# Patient Record
Sex: MALE | Race: WHITE | Employment: FULL TIME | URBAN - METROPOLITAN AREA
[De-identification: names, ages, dates, MRNs, and addresses within clinical notes are randomized per-mention and may not be internally consistent; named-entity substitution may affect disease eponyms.]

---

## 2017-08-29 ENCOUNTER — OFFICE VISIT (OUTPATIENT)
Dept: ORTHOPEDIC SURGERY | Age: 42
End: 2017-08-29

## 2017-08-29 ENCOUNTER — OFFICE VISIT (OUTPATIENT)
Dept: FAMILY MEDICINE CLINIC | Age: 42
End: 2017-08-29

## 2017-08-29 VITALS — HEIGHT: 73 IN | BODY MASS INDEX: 34.46 KG/M2 | WEIGHT: 260 LBS

## 2017-08-29 VITALS
OXYGEN SATURATION: 97 % | HEART RATE: 88 BPM | BODY MASS INDEX: 34.72 KG/M2 | HEIGHT: 73 IN | SYSTOLIC BLOOD PRESSURE: 122 MMHG | WEIGHT: 262 LBS | DIASTOLIC BLOOD PRESSURE: 78 MMHG | TEMPERATURE: 98 F | RESPIRATION RATE: 18 BRPM

## 2017-08-29 DIAGNOSIS — E11.9 TYPE 2 DIABETES MELLITUS WITHOUT COMPLICATION, WITHOUT LONG-TERM CURRENT USE OF INSULIN (HCC): ICD-10-CM

## 2017-08-29 DIAGNOSIS — M25.512 ACUTE PAIN OF LEFT SHOULDER: Primary | ICD-10-CM

## 2017-08-29 DIAGNOSIS — S46.012A ROTATOR CUFF STRAIN, LEFT, INITIAL ENCOUNTER: ICD-10-CM

## 2017-08-29 DIAGNOSIS — M79.632 LEFT FOREARM PAIN: ICD-10-CM

## 2017-08-29 DIAGNOSIS — G89.29 CHRONIC LEFT SHOULDER PAIN: ICD-10-CM

## 2017-08-29 DIAGNOSIS — E78.5 HYPERLIPIDEMIA, UNSPECIFIED HYPERLIPIDEMIA TYPE: Primary | ICD-10-CM

## 2017-08-29 DIAGNOSIS — M25.512 CHRONIC LEFT SHOULDER PAIN: ICD-10-CM

## 2017-08-29 LAB
ALBUMIN SERPL-MCNC: 4.1 G/DL (ref 3.4–5)
ALP BLD-CCNC: 80 U/L (ref 40–129)
ALT SERPL-CCNC: 36 U/L (ref 10–40)
ANION GAP SERPL CALCULATED.3IONS-SCNC: 12 MMOL/L (ref 3–16)
AST SERPL-CCNC: 28 U/L (ref 15–37)
BILIRUB SERPL-MCNC: 0.4 MG/DL (ref 0–1)
BILIRUBIN DIRECT: <0.2 MG/DL (ref 0–0.3)
BILIRUBIN, INDIRECT: NORMAL MG/DL (ref 0–1)
BUN BLDV-MCNC: 16 MG/DL (ref 7–20)
CALCIUM SERPL-MCNC: 9.2 MG/DL (ref 8.3–10.6)
CHLORIDE BLD-SCNC: 101 MMOL/L (ref 99–110)
CHOLESTEROL, TOTAL: 226 MG/DL (ref 0–199)
CO2: 25 MMOL/L (ref 21–32)
CREAT SERPL-MCNC: 0.9 MG/DL (ref 0.9–1.3)
GFR AFRICAN AMERICAN: >60
GFR NON-AFRICAN AMERICAN: >60
GLUCOSE BLD-MCNC: 166 MG/DL (ref 70–99)
HDLC SERPL-MCNC: 41 MG/DL (ref 40–60)
LDL CHOLESTEROL CALCULATED: ABNORMAL MG/DL
LDL CHOLESTEROL DIRECT: 138 MG/DL
POTASSIUM SERPL-SCNC: 4.6 MMOL/L (ref 3.5–5.1)
SODIUM BLD-SCNC: 138 MMOL/L (ref 136–145)
TOTAL PROTEIN: 6.7 G/DL (ref 6.4–8.2)
TRIGL SERPL-MCNC: 303 MG/DL (ref 0–150)
VLDLC SERPL CALC-MCNC: ABNORMAL MG/DL

## 2017-08-29 PROCEDURE — 73030 X-RAY EXAM OF SHOULDER: CPT | Performed by: ORTHOPAEDIC SURGERY

## 2017-08-29 PROCEDURE — 36415 COLL VENOUS BLD VENIPUNCTURE: CPT | Performed by: FAMILY MEDICINE

## 2017-08-29 PROCEDURE — 99243 OFF/OP CNSLTJ NEW/EST LOW 30: CPT | Performed by: ORTHOPAEDIC SURGERY

## 2017-08-29 PROCEDURE — 73090 X-RAY EXAM OF FOREARM: CPT | Performed by: ORTHOPAEDIC SURGERY

## 2017-08-29 PROCEDURE — 99213 OFFICE O/P EST LOW 20 MIN: CPT | Performed by: FAMILY MEDICINE

## 2017-08-29 RX ORDER — MELOXICAM 15 MG/1
15 TABLET ORAL DAILY
Qty: 30 TABLET | Refills: 3 | Status: SHIPPED | OUTPATIENT
Start: 2017-08-29 | End: 2018-07-20 | Stop reason: ALTCHOICE

## 2017-08-29 RX ORDER — BRIMONIDINE TARTRATE 0.15 %
DROPS OPHTHALMIC (EYE)
Refills: 0 | COMMUNITY
Start: 2017-07-27 | End: 2018-07-20 | Stop reason: ALTCHOICE

## 2017-08-29 RX ORDER — ATORVASTATIN CALCIUM 40 MG/1
40 TABLET, FILM COATED ORAL DAILY
Qty: 30 TABLET | Refills: 8 | Status: SHIPPED | OUTPATIENT
Start: 2017-08-29 | End: 2018-07-20 | Stop reason: SDUPTHER

## 2017-08-29 RX ORDER — BRIMONIDINE TARTRATE 0.1 %
DROPS OPHTHALMIC (EYE)
Refills: 3 | COMMUNITY
Start: 2017-07-04 | End: 2018-07-20 | Stop reason: ALTCHOICE

## 2017-08-30 LAB
ESTIMATED AVERAGE GLUCOSE: 168.6 MG/DL
HBA1C MFR BLD: 7.5 %

## 2017-08-30 RX ORDER — METFORMIN HYDROCHLORIDE 500 MG/1
TABLET, EXTENDED RELEASE ORAL
Qty: 60 TABLET | Refills: 5 | Status: SHIPPED | OUTPATIENT
Start: 2017-08-30 | End: 2018-05-22 | Stop reason: SDUPTHER

## 2017-09-06 ENCOUNTER — HOSPITAL ENCOUNTER (OUTPATIENT)
Dept: PHYSICAL THERAPY | Age: 42
Discharge: HOME | End: 2017-09-07
Admitting: ORTHOPAEDIC SURGERY

## 2017-09-08 ENCOUNTER — HOSPITAL ENCOUNTER (OUTPATIENT)
Dept: PHYSICAL THERAPY | Age: 42
Discharge: HOME | End: 2017-09-09
Admitting: ORTHOPAEDIC SURGERY

## 2017-11-10 ENCOUNTER — OFFICE VISIT (OUTPATIENT)
Dept: ORTHOPEDIC SURGERY | Age: 42
End: 2017-11-10

## 2017-11-10 DIAGNOSIS — M75.82 TENDINITIS OF LEFT ROTATOR CUFF: Primary | ICD-10-CM

## 2017-11-10 PROCEDURE — 99213 OFFICE O/P EST LOW 20 MIN: CPT | Performed by: ORTHOPAEDIC SURGERY

## 2017-11-22 ENCOUNTER — OFFICE VISIT (OUTPATIENT)
Dept: ORTHOPEDIC SURGERY | Age: 42
End: 2017-11-22

## 2017-11-22 VITALS — HEIGHT: 73 IN | BODY MASS INDEX: 34.45 KG/M2 | WEIGHT: 259.92 LBS

## 2017-11-22 DIAGNOSIS — M75.02 ADHESIVE CAPSULITIS OF LEFT SHOULDER: Primary | ICD-10-CM

## 2017-11-22 PROCEDURE — 99213 OFFICE O/P EST LOW 20 MIN: CPT | Performed by: ORTHOPAEDIC SURGERY

## 2017-11-22 NOTE — PROGRESS NOTES
Chief Complaint    Results (MRI results)      History of Present Illness:  Sergei Petersen is a 38 y.o. male. Follow for his left shoulder. He did have an MRI and is here today for results. Continues to have pain and limited motion of the left shoulder       Medical History:  Patient's medications, allergies, past medical, surgical, social and family histories were reviewed and updated as appropriate. Review of Systems:  Pertinent items are noted in HPI  Review of systems reviewed from Patient History Form dated on 11/22/17 and available in the patient's chart under the Media tab. Vital Signs:  Ht 6' 0.99\" (1.854 m)   Wt 259 lb 14.8 oz (117.9 kg)   BMI 34.30 kg/m²     General Exam:   Constitutional: Patient is adequately groomed with no evidence of malnutrition  DTRs: Deep tendon reflexes are intact  Mental Status: The patient is oriented to time, place and person. The patient's mood and affect are appropriate. Shoulder Examination:    Inspection:  No significant swelling erythema noted about the left shoulder or the left forearm     Palpation:  No tenderness palpation over the a.c. joint or bicipital groove.  No tenderness today about the forearm or elbow     Range of Motion:   active forward elevation is to 120°. External rotation is 60°.   Internal rotation is the top of the ilium    Strength:  His rotator cuff strength is 4+ out of 5 with rotator cuff testing in all directions.     Special Tests: [facility] has negative drop arm exam. Marcelino Blazing is some pain and weakness with Alexander's active compression testing.  Negative speed's exam.  Negative apprehension exam.  Negative drop arm exam. [facility] does have some pain with resisted pronation of left forearm     Skin: There are no rashes, ulcerations or lesions.     Gait: He is walking with a normal gait    Additional Comments:       Additional Examinations:         Right Upper Extremity:  Examination of the right upper extremity does not show any tenderness,

## 2017-12-08 ENCOUNTER — TELEPHONE (OUTPATIENT)
Dept: FAMILY MEDICINE CLINIC | Age: 42
End: 2017-12-08

## 2017-12-08 NOTE — TELEPHONE ENCOUNTER
It does appear that he has diabetes. We can send then the problem list and his current medication list, or write a letter to that effect. He should be able to get this from his current PCP also. We will need a release of records prior to faxing anything.   Thanks   Aundrea Arrieta

## 2017-12-08 NOTE — TELEPHONE ENCOUNTER
Patient called. Nevin Valles he was applying for insurance through work. Patient was contacted by Angel Arredondo at [facility] (phone [phone], case # [id number]) and told that she had spoke with a Katharina. [facility] told Angel Arredondo that there was only one visit associated with blood work, 11/2016. Patient said he was seen in September for his shoulder and requested a refill on his cholesterol med. The doctor did an A1c and put him on metformin. Do not see a Sept visit but the one in June 2016 does list an A1c order. Said he needs form to show he has diabetes and is on the metformin. Form and request are attached. Not sure about the release. It has a digital signature. Told him he may be required to complete a [facility] release before anything can be faxed.     Routing this to both Elijah Vega NP and Deborah Lozano CMA (was  [facility])

## 2017-12-15 NOTE — TELEPHONE ENCOUNTER
When I asked for the new release, patient said he doesn't have a new doctor yet. Received another fax to day requesting status on response. Will scan and attach.

## 2018-02-01 NOTE — PROGRESS NOTES
Results for the following test have been finalized and entered into the patients chart
with resisted pronation of left forearm     Skin: There are no rashes, ulcerations or lesions.     Gait: He is walking with a normal gait    Additional Comments:       Additional Examinations:         Neck: Examination of the neck does not show any tenderness, deformity or injury. Range of motion is unremarkable. There is no gross instability. There are no rashes, ulcerations or lesions. Strength and tone are normal.        Assessment :  Left shoulder concern for rotator cuff tear, adhesive capsulitis    Impression:  Encounter Diagnosis   Name Primary?  Tendinitis of left rotator cuff Yes       Office Procedures:  No orders of the defined types were placed in this encounter. Treatment Plan:  He has not had improvement despite 8 weeks of physical therapy. I would recommend at this time MRI of the shoulder rule out rotator cuff tear also to evaluate for thickening of his Adhesive Capsulitis. See Him Back Once the MRI Is Completed to Go over Those Results.

## 2018-05-23 RX ORDER — METFORMIN HYDROCHLORIDE 500 MG/1
TABLET, EXTENDED RELEASE ORAL
Qty: 60 TABLET | Refills: 0 | Status: SHIPPED | OUTPATIENT
Start: 2018-05-23 | End: 2018-07-20 | Stop reason: SDUPTHER

## 2018-07-20 ENCOUNTER — TELEPHONE (OUTPATIENT)
Dept: FAMILY MEDICINE CLINIC | Age: 43
End: 2018-07-20

## 2018-07-20 ENCOUNTER — OFFICE VISIT (OUTPATIENT)
Dept: FAMILY MEDICINE CLINIC | Age: 43
End: 2018-07-20

## 2018-07-20 VITALS
BODY MASS INDEX: 33.93 KG/M2 | WEIGHT: 256 LBS | SYSTOLIC BLOOD PRESSURE: 124 MMHG | HEART RATE: 86 BPM | HEIGHT: 73 IN | OXYGEN SATURATION: 97 % | DIASTOLIC BLOOD PRESSURE: 86 MMHG

## 2018-07-20 DIAGNOSIS — H40.9 GLAUCOMA OF BOTH EYES, UNSPECIFIED GLAUCOMA TYPE: ICD-10-CM

## 2018-07-20 DIAGNOSIS — E78.2 MIXED HYPERLIPIDEMIA: ICD-10-CM

## 2018-07-20 DIAGNOSIS — E11.9 TYPE 2 DIABETES MELLITUS WITHOUT COMPLICATION, WITHOUT LONG-TERM CURRENT USE OF INSULIN (HCC): Primary | ICD-10-CM

## 2018-07-20 LAB
A/G RATIO: 2 (ref 1.1–2.2)
ALBUMIN SERPL-MCNC: 4.5 G/DL (ref 3.4–5)
ALP BLD-CCNC: 86 U/L (ref 40–129)
ALT SERPL-CCNC: 37 U/L (ref 10–40)
ANION GAP SERPL CALCULATED.3IONS-SCNC: 14 MMOL/L (ref 3–16)
AST SERPL-CCNC: 22 U/L (ref 15–37)
BASOPHILS ABSOLUTE: 0.1 K/UL (ref 0–0.2)
BASOPHILS RELATIVE PERCENT: 0.9 %
BILIRUB SERPL-MCNC: 0.6 MG/DL (ref 0–1)
BUN BLDV-MCNC: 15 MG/DL (ref 7–20)
CALCIUM SERPL-MCNC: 9.5 MG/DL (ref 8.3–10.6)
CHLORIDE BLD-SCNC: 101 MMOL/L (ref 99–110)
CHOLESTEROL, TOTAL: 128 MG/DL (ref 0–199)
CO2: 25 MMOL/L (ref 21–32)
CREAT SERPL-MCNC: 1 MG/DL (ref 0.9–1.3)
CREATININE URINE POCT: 300
EOSINOPHILS ABSOLUTE: 0.1 K/UL (ref 0–0.6)
EOSINOPHILS RELATIVE PERCENT: 1.7 %
GFR AFRICAN AMERICAN: >60
GFR NON-AFRICAN AMERICAN: >60
GLOBULIN: 2.3 G/DL
GLUCOSE BLD-MCNC: 131 MG/DL (ref 70–99)
HBA1C MFR BLD: 7.6 %
HCT VFR BLD CALC: 43 % (ref 40.5–52.5)
HDLC SERPL-MCNC: 45 MG/DL (ref 40–60)
HEMOGLOBIN: 14.6 G/DL (ref 13.5–17.5)
LDL CHOLESTEROL CALCULATED: 62 MG/DL
LYMPHOCYTES ABSOLUTE: 2.1 K/UL (ref 1–5.1)
LYMPHOCYTES RELATIVE PERCENT: 28 %
MCH RBC QN AUTO: 31.8 PG (ref 26–34)
MCHC RBC AUTO-ENTMCNC: 34 G/DL (ref 31–36)
MCV RBC AUTO: 93.6 FL (ref 80–100)
MICROALBUMIN/CREAT 24H UR: 30 MG/G{CREAT}
MICROALBUMIN/CREAT UR-RTO: <30
MONOCYTES ABSOLUTE: 0.6 K/UL (ref 0–1.3)
MONOCYTES RELATIVE PERCENT: 7.8 %
NEUTROPHILS ABSOLUTE: 4.7 K/UL (ref 1.7–7.7)
NEUTROPHILS RELATIVE PERCENT: 61.6 %
PDW BLD-RTO: 13.4 % (ref 12.4–15.4)
PLATELET # BLD: 238 K/UL (ref 135–450)
PMV BLD AUTO: 8 FL (ref 5–10.5)
POTASSIUM SERPL-SCNC: 4.4 MMOL/L (ref 3.5–5.1)
RBC # BLD: 4.6 M/UL (ref 4.2–5.9)
SODIUM BLD-SCNC: 140 MMOL/L (ref 136–145)
TOTAL PROTEIN: 6.8 G/DL (ref 6.4–8.2)
TRIGL SERPL-MCNC: 107 MG/DL (ref 0–150)
VLDLC SERPL CALC-MCNC: 21 MG/DL
WBC # BLD: 7.6 K/UL (ref 4–11)

## 2018-07-20 PROCEDURE — 2022F DILAT RTA XM EVC RTNOPTHY: CPT | Performed by: FAMILY MEDICINE

## 2018-07-20 PROCEDURE — 83036 HEMOGLOBIN GLYCOSYLATED A1C: CPT | Performed by: FAMILY MEDICINE

## 2018-07-20 PROCEDURE — G8427 DOCREV CUR MEDS BY ELIG CLIN: HCPCS | Performed by: FAMILY MEDICINE

## 2018-07-20 PROCEDURE — G8417 CALC BMI ABV UP PARAM F/U: HCPCS | Performed by: FAMILY MEDICINE

## 2018-07-20 PROCEDURE — 3045F PR MOST RECENT HEMOGLOBIN A1C LEVEL 7.0-9.0%: CPT | Performed by: FAMILY MEDICINE

## 2018-07-20 PROCEDURE — 1036F TOBACCO NON-USER: CPT | Performed by: FAMILY MEDICINE

## 2018-07-20 PROCEDURE — 99214 OFFICE O/P EST MOD 30 MIN: CPT | Performed by: FAMILY MEDICINE

## 2018-07-20 PROCEDURE — 82044 UR ALBUMIN SEMIQUANTITATIVE: CPT | Performed by: FAMILY MEDICINE

## 2018-07-20 RX ORDER — LANCETS 30 GAUGE
EACH MISCELLANEOUS
Qty: 100 EACH | Refills: 1 | Status: SHIPPED | OUTPATIENT
Start: 2018-07-20

## 2018-07-20 RX ORDER — METFORMIN HYDROCHLORIDE 500 MG/1
1000 TABLET, FILM COATED, EXTENDED RELEASE ORAL 2 TIMES DAILY WITH MEALS
Qty: 120 TABLET | Refills: 5 | Status: SHIPPED | OUTPATIENT
Start: 2018-07-20 | End: 2018-07-24 | Stop reason: SDUPTHER

## 2018-07-20 RX ORDER — ATORVASTATIN CALCIUM 40 MG/1
40 TABLET, FILM COATED ORAL DAILY
Qty: 30 TABLET | Refills: 5 | Status: SHIPPED | OUTPATIENT
Start: 2018-07-20 | End: 2019-01-25 | Stop reason: SDUPTHER

## 2018-07-20 RX ORDER — GLUCOSAMINE HCL/CHONDROITIN SU 500-400 MG
CAPSULE ORAL
Qty: 100 STRIP | Refills: 1 | Status: SHIPPED | OUTPATIENT
Start: 2018-07-20

## 2018-07-20 RX ORDER — BRIMONIDINE TARTRATE 2 MG/ML
1 SOLUTION/ DROPS OPHTHALMIC 3 TIMES DAILY
COMMUNITY

## 2018-07-20 RX ORDER — METFORMIN HYDROCHLORIDE 500 MG/1
1000 TABLET, EXTENDED RELEASE ORAL 2 TIMES DAILY WITH MEALS
Qty: 120 TABLET | Refills: 5 | Status: SHIPPED | OUTPATIENT
Start: 2018-07-20 | End: 2018-07-20

## 2018-07-20 RX ORDER — DORZOLAMIDE HYDROCHLORIDE AND TIMOLOL MALEATE 20; 5 MG/ML; MG/ML
1 SOLUTION/ DROPS OPHTHALMIC 2 TIMES DAILY
COMMUNITY

## 2018-07-20 ASSESSMENT — PATIENT HEALTH QUESTIONNAIRE - PHQ9
SUM OF ALL RESPONSES TO PHQ QUESTIONS 1-9: 0
2. FEELING DOWN, DEPRESSED OR HOPELESS: 0
1. LITTLE INTEREST OR PLEASURE IN DOING THINGS: 0
SUM OF ALL RESPONSES TO PHQ9 QUESTIONS 1 & 2: 0

## 2018-07-20 NOTE — PROGRESS NOTES
type  Continue regular follow up per Opthalmology. Return in about 6 months (around 1/20/2019) for Preventative.

## 2018-07-24 RX ORDER — METFORMIN HYDROCHLORIDE 500 MG/1
1000 TABLET, FILM COATED, EXTENDED RELEASE ORAL 2 TIMES DAILY WITH MEALS
Qty: 120 TABLET | Refills: 5 | Status: SHIPPED | OUTPATIENT
Start: 2018-07-24 | End: 2019-01-25 | Stop reason: SDUPTHER

## 2019-01-25 ENCOUNTER — OFFICE VISIT (OUTPATIENT)
Dept: FAMILY MEDICINE CLINIC | Age: 44
End: 2019-01-25
Payer: COMMERCIAL

## 2019-01-25 VITALS
DIASTOLIC BLOOD PRESSURE: 82 MMHG | OXYGEN SATURATION: 97 % | BODY MASS INDEX: 33.64 KG/M2 | WEIGHT: 255 LBS | SYSTOLIC BLOOD PRESSURE: 110 MMHG | HEART RATE: 76 BPM

## 2019-01-25 DIAGNOSIS — R05.9 COUGH: ICD-10-CM

## 2019-01-25 DIAGNOSIS — Z00.00 PREVENTATIVE HEALTH CARE: Primary | ICD-10-CM

## 2019-01-25 DIAGNOSIS — E11.9 TYPE 2 DIABETES MELLITUS WITHOUT COMPLICATION, WITHOUT LONG-TERM CURRENT USE OF INSULIN (HCC): ICD-10-CM

## 2019-01-25 DIAGNOSIS — E78.2 MIXED HYPERLIPIDEMIA: ICD-10-CM

## 2019-01-25 LAB — HBA1C MFR BLD: 7.5 %

## 2019-01-25 PROCEDURE — G8484 FLU IMMUNIZE NO ADMIN: HCPCS | Performed by: FAMILY MEDICINE

## 2019-01-25 PROCEDURE — 83036 HEMOGLOBIN GLYCOSYLATED A1C: CPT | Performed by: FAMILY MEDICINE

## 2019-01-25 PROCEDURE — 99396 PREV VISIT EST AGE 40-64: CPT | Performed by: FAMILY MEDICINE

## 2019-01-25 RX ORDER — METFORMIN HYDROCHLORIDE 500 MG/1
1000 TABLET, FILM COATED, EXTENDED RELEASE ORAL 2 TIMES DAILY WITH MEALS
Qty: 360 TABLET | Refills: 1 | Status: SHIPPED | OUTPATIENT
Start: 2019-01-25 | End: 2019-09-26 | Stop reason: SDUPTHER

## 2019-01-25 RX ORDER — BENZONATATE 200 MG/1
200 CAPSULE ORAL 3 TIMES DAILY PRN
Qty: 30 CAPSULE | Refills: 1 | Status: SHIPPED | OUTPATIENT
Start: 2019-01-25 | End: 2020-10-19 | Stop reason: ALTCHOICE

## 2019-01-25 RX ORDER — ATORVASTATIN CALCIUM 40 MG/1
40 TABLET, FILM COATED ORAL DAILY
Qty: 90 TABLET | Refills: 1 | Status: SHIPPED | OUTPATIENT
Start: 2019-01-25 | End: 2019-09-26 | Stop reason: SDUPTHER

## 2019-01-25 ASSESSMENT — ENCOUNTER SYMPTOMS: COUGH: 1

## 2019-09-26 ENCOUNTER — OFFICE VISIT (OUTPATIENT)
Dept: FAMILY MEDICINE CLINIC | Age: 44
End: 2019-09-26
Payer: COMMERCIAL

## 2019-09-26 VITALS
WEIGHT: 253 LBS | OXYGEN SATURATION: 98 % | HEIGHT: 72 IN | SYSTOLIC BLOOD PRESSURE: 132 MMHG | DIASTOLIC BLOOD PRESSURE: 86 MMHG | HEART RATE: 65 BPM | BODY MASS INDEX: 34.27 KG/M2

## 2019-09-26 DIAGNOSIS — E11.9 TYPE 2 DIABETES MELLITUS WITHOUT COMPLICATION, WITHOUT LONG-TERM CURRENT USE OF INSULIN (HCC): Primary | ICD-10-CM

## 2019-09-26 DIAGNOSIS — E78.2 MIXED HYPERLIPIDEMIA: ICD-10-CM

## 2019-09-26 DIAGNOSIS — Z23 NEED FOR PNEUMOCOCCAL VACCINATION: ICD-10-CM

## 2019-09-26 LAB
CREATININE URINE POCT: 300
HBA1C MFR BLD: 8.4 %
MICROALBUMIN/CREAT 24H UR: 30 MG/G{CREAT}
MICROALBUMIN/CREAT UR-RTO: <30

## 2019-09-26 PROCEDURE — 82044 UR ALBUMIN SEMIQUANTITATIVE: CPT | Performed by: FAMILY MEDICINE

## 2019-09-26 PROCEDURE — 1036F TOBACCO NON-USER: CPT | Performed by: FAMILY MEDICINE

## 2019-09-26 PROCEDURE — G8417 CALC BMI ABV UP PARAM F/U: HCPCS | Performed by: FAMILY MEDICINE

## 2019-09-26 PROCEDURE — 90471 IMMUNIZATION ADMIN: CPT | Performed by: FAMILY MEDICINE

## 2019-09-26 PROCEDURE — 2022F DILAT RTA XM EVC RTNOPTHY: CPT | Performed by: FAMILY MEDICINE

## 2019-09-26 PROCEDURE — G8427 DOCREV CUR MEDS BY ELIG CLIN: HCPCS | Performed by: FAMILY MEDICINE

## 2019-09-26 PROCEDURE — 99213 OFFICE O/P EST LOW 20 MIN: CPT | Performed by: FAMILY MEDICINE

## 2019-09-26 PROCEDURE — 3045F PR MOST RECENT HEMOGLOBIN A1C LEVEL 7.0-9.0%: CPT | Performed by: FAMILY MEDICINE

## 2019-09-26 PROCEDURE — 90732 PPSV23 VACC 2 YRS+ SUBQ/IM: CPT | Performed by: FAMILY MEDICINE

## 2019-09-26 PROCEDURE — 83036 HEMOGLOBIN GLYCOSYLATED A1C: CPT | Performed by: FAMILY MEDICINE

## 2019-09-26 RX ORDER — ATORVASTATIN CALCIUM 40 MG/1
40 TABLET, FILM COATED ORAL DAILY
Qty: 90 TABLET | Refills: 1 | Status: SHIPPED | OUTPATIENT
Start: 2019-09-26 | End: 2020-07-28 | Stop reason: SDUPTHER

## 2019-09-26 RX ORDER — METFORMIN HYDROCHLORIDE 500 MG/1
1000 TABLET, FILM COATED, EXTENDED RELEASE ORAL 2 TIMES DAILY WITH MEALS
Qty: 360 TABLET | Refills: 1 | Status: SHIPPED | OUTPATIENT
Start: 2019-09-26 | End: 2020-05-26

## 2019-09-26 ASSESSMENT — PATIENT HEALTH QUESTIONNAIRE - PHQ9
SUM OF ALL RESPONSES TO PHQ QUESTIONS 1-9: 0
SUM OF ALL RESPONSES TO PHQ QUESTIONS 1-9: 0
SUM OF ALL RESPONSES TO PHQ9 QUESTIONS 1 & 2: 0
1. LITTLE INTEREST OR PLEASURE IN DOING THINGS: 0
2. FEELING DOWN, DEPRESSED OR HOPELESS: 0

## 2020-05-29 ENCOUNTER — TELEPHONE (OUTPATIENT)
Dept: FAMILY MEDICINE CLINIC | Age: 45
End: 2020-05-29

## 2020-07-27 NOTE — TELEPHONE ENCOUNTER
Last office visit: 9/26/2019    Last written: 9/26/19    Future office visit: 8/25/2020    Requested Prescriptions     Pending Prescriptions Disp Refills    atorvastatin (LIPITOR) 40 MG tablet 90 tablet 1     Sig: Take 1 tablet by mouth daily

## 2020-07-28 RX ORDER — ATORVASTATIN CALCIUM 40 MG/1
40 TABLET, FILM COATED ORAL DAILY
Qty: 90 TABLET | Refills: 1 | Status: SHIPPED | OUTPATIENT
Start: 2020-07-28 | End: 2021-01-14 | Stop reason: SDUPTHER

## 2020-08-21 ENCOUNTER — TELEPHONE (OUTPATIENT)
Dept: FAMILY MEDICINE CLINIC | Age: 45
End: 2020-08-21

## 2020-08-21 NOTE — TELEPHONE ENCOUNTER
----- Message from Cindy Nelson sent at 8/21/2020  3:14 PM EDT -----  Subject: Appointment Request    Reason for Call: Routine Physical Exam    QUESTIONS  Type of Appointment? Established Patient  Reason for appointment request? No appointments available during search  Additional Information for Provider? patient called to cancel on appt 8/25   for physical. no appointment times shown when attempting to reschedule. he   would prefer morning appointment since he says there will be blood work   involved. thank you  ---------------------------------------------------------------------------  --------------  CALL BACK INFO  What is the best way for the office to contact you? OK to leave message on   voicemail  Preferred Call Back Phone Number? [phone]  ---------------------------------------------------------------------------  --------------  SCRIPT ANSWERS  Relationship to Patient? Self  Have your symptoms changed? No  Appointment reason? Well Care/Follow Ups  Select a Well Care/Follow Ups appointment reason? Adult Physical Exam   [Medicare Annual Wellness   AWV   PAP   Pelvic]  (Is the patient requesting to be seen urgently for their symptoms?)? No  (If the patient is female   ask this question) Are you requesting a pap smear with your physical   exam? No  (Patient is Medicare and requesting Annual Wellness Visit)? No  Have you been diagnosed with   tested for   or told that you are suspected of having COVID-19 (Coronavirus)? No  Have you had a fever or taken medication to treat a fever within the past   3 days? No  Have you had a cough   shortness of breath or flu-like symptoms within the past 3 days? No  Do you currently have flu-like symptoms including fever or chills   cough   shortness of breath   or difficulty breathing   or new loss of taste or smell? No  (Service Expert  click yes below to proceed with [facility] As Usual   Scheduling)?  Yes

## 2020-09-23 ENCOUNTER — OFFICE VISIT (OUTPATIENT)
Dept: ORTHOPEDIC SURGERY | Age: 45
End: 2020-09-23
Payer: COMMERCIAL

## 2020-09-23 VITALS — WEIGHT: 250 LBS | BODY MASS INDEX: 33.13 KG/M2 | HEIGHT: 73 IN

## 2020-09-23 PROCEDURE — G8417 CALC BMI ABV UP PARAM F/U: HCPCS | Performed by: ORTHOPAEDIC SURGERY

## 2020-09-23 PROCEDURE — G8427 DOCREV CUR MEDS BY ELIG CLIN: HCPCS | Performed by: ORTHOPAEDIC SURGERY

## 2020-09-23 PROCEDURE — 1036F TOBACCO NON-USER: CPT | Performed by: ORTHOPAEDIC SURGERY

## 2020-09-23 PROCEDURE — 99213 OFFICE O/P EST LOW 20 MIN: CPT | Performed by: ORTHOPAEDIC SURGERY

## 2020-09-23 NOTE — PROGRESS NOTES
Chief Complaint    Knee Pain (lt knee ogoing pain for years, hx of a few falls over the years, knee clicks getting worse; medial and posterior pain)      History of Present Illness:  Phani Martin is a 50 y.o. male who comes in today for evaluation treatment of left knee pain. Referred in today for orthopedic consultation the request of his PCP Dr. Zakiya Hansen. Patient's had on and off knee pain for a number of years. Reports an injury that he sustained in his left knee over 13 years ago when he stepped down off of a ladder and had sharp medial knee pain. He was able to nurse this along but over the last couple of months his symptoms have been getting worse. He is reporting increased medial knee pain with locking and catching sensation, instability, increased crepitation. He reports to be an intermittent sharp pain over the medial aspect of the knee. He has been wearing a knee brace regularly for the last couple of months, taking over-the-counter oral anti-inflammatories, and was given an independent home exercise program by his primary care physician. He is been doing these exercises for at least the last 8- 9 weeks with little to no improvement.       Pain Assessment  Location of Pain: Knee  Location Modifiers: Left, Posterior, Medial  Severity of Pain: 8  Frequency of Pain: Intermittent  Aggravating Factors: Walking, Standing  Limiting Behavior: Some  Result of Injury: Yes  Work-Related Injury: No  Are there other pain locations you wish to document?: No    Medical History:  Past Medical History:   Diagnosis Date    Hyperlipidemia      Patient Active Problem List    Diagnosis Date Noted    Adhesive capsulitis of left shoulder 11/22/2017    Tendinitis of left rotator cuff 11/10/2017    [facility] 53/30/7088    Mixed hyperlipidemia 05/31/2013    Hyperglycemia 05/31/2013     Current Outpatient Medications   Medication Sig Dispense Refill    atorvastatin (LIPITOR) 40 MG tablet Take 1 tablet by mouth daily 90 tablet 1    metFORMIN (GLUCOPHAGE-XR) 500 MG extended release tablet TAKE 2 TABLETS BY MOUTH TWICE A DAY WITH MEALS 360 tablet 0    benzonatate (TESSALON) 200 MG capsule Take 1 capsule by mouth 3 times daily as needed for Cough 30 capsule 1    brimonidine (ALPHAGAN) 0.2 % ophthalmic solution 1 drop 3 times daily      dorzolamide-timolol (COSOPT) 22.3-6.8 MG/ML ophthalmic solution 1 drop 2 times daily      blood glucose monitor strips Dispense what insurance will cover. 100 strip 1    Lancets MISC Dispense lancets insurance will cover. 100 each 1    Misc. Devices MISC 1 each by Does not apply route once for 1 dose Dispense what patients insurance will cover. 1 Device 0    LUMIGAN 0.01 % SOLN ophthalmic drops   5     No current facility-administered medications for this visit. Review of Systems:  Relevant review of systems reviewed and available in the patient's chart    Vital Signs:  Ht 6' 1\" (1.854 m)   Wt 250 lb (113.4 kg)   BMI 32.98 kg/m²     General Exam:   Constitutional: Patient is adequately groomed with no evidence of malnutrition  DTRs: Deep tendon reflexes are intact  Mental Status: The patient is oriented to time, place and person. The patient's mood and affect are appropriate. Lymphatic: The lymphatic examination bilaterally reveals all areas to be without enlargement or induration. Vascular: Examination reveals no swelling or calf tenderness. Peripheral pulses are palpable and 2+. Neurological: The patient has good coordination. There is no weakness or sensory deficit. Body mass index is 32.98 kg/m². Left knee Examination:    Inspection:  No swelling, ecchymosis or deformity    Palpation:  Tenderness to palpation directly over the medial joint line    Range of Motion:  Well-preserved range of motion, 0-120°.   There is some crepitation with flexion extension of the knee    Strength:  4+/5 quad and hamstring strength    Special Tests:  Positive Mariia's examination. Stable to varus and valgus stress testing. Negative Lachman's exam    Skin: There are no rashes, ulcerations or lesions. Gait: Mild antalgic gait    Reflex normal deep tendon reflexes    Additional Comments:       Additional Examinations:         Contralateral Exam: Examination of the right knee reveals intact skin. There is no focal tenderness. The patient demonstrates full painless range of motion with regard to flexion and extension. Strength is 5/5 throughout all planes. Ligamentous stability is grossly intact. Examination of the left hip reveals intact skin. The patient demonstrates full painless range of motion with regards to flexion, abduction, internal and external rotation. There is no tenderness about the greater trochanter. There is a negative straight leg raise against resistance. Strength is 5/5 throughout all planes. Radiology:     X-rays obtained and reviewed in office:  Views 4 views including AP weightbearing, PA flexed weightbearing, lateral, and skyline  Location left knee  Impression he has well-maintained joint space but does demonstrate evidence of a chondral changes and irregularity of all 3 compartments. No evidence of any loose bodies. There may be a small area of OCD/ SONK in the medial femoral condyle. Impression:  Encounter Diagnoses   Name Primary?     Left knee pain, unspecified chronicity     Tear of medial meniscus of left knee, unspecified tear type, unspecified whether old or current tear, initial encounter Yes       Office Procedures:  Orders Placed This Encounter   Procedures    XR KNEE LEFT (MIN 4 VIEWS)     Standing Status:   Future     Number of Occurrences:   1     Standing Expiration Date:   9/21/2021    MRI KNEE LEFT WO CONTRAST     Standing Status:   Future     Standing Expiration Date:   9/23/2021     Scheduling Instructions:      Francisco Moreno      [phone] [address]; [address], [address]            [facility]            PATIENT TO CALL AND SCHEDULE WHEN SCAN APPROVED     Order Specific Question:   Reason for exam:     Answer:   R/O MMT       Treatment Plan: Today have gone over the treatment recommendations. He has had persistent knee pain with no improvements in conservative treatment. I would recommend an MRI of the left knee to evaluate for medial meniscal pathology. We will continue with his exercise program, bracing, and oral anti-inflammatories we will see him back after the MRI to go over the results.

## 2020-10-07 ENCOUNTER — OFFICE VISIT (OUTPATIENT)
Dept: ORTHOPEDIC SURGERY | Age: 45
End: 2020-10-07
Payer: COMMERCIAL

## 2020-10-07 PROCEDURE — 1036F TOBACCO NON-USER: CPT | Performed by: PHYSICIAN ASSISTANT

## 2020-10-07 PROCEDURE — G8484 FLU IMMUNIZE NO ADMIN: HCPCS | Performed by: PHYSICIAN ASSISTANT

## 2020-10-07 PROCEDURE — G8427 DOCREV CUR MEDS BY ELIG CLIN: HCPCS | Performed by: PHYSICIAN ASSISTANT

## 2020-10-07 PROCEDURE — G8417 CALC BMI ABV UP PARAM F/U: HCPCS | Performed by: PHYSICIAN ASSISTANT

## 2020-10-07 PROCEDURE — 99214 OFFICE O/P EST MOD 30 MIN: CPT | Performed by: PHYSICIAN ASSISTANT

## 2020-10-07 NOTE — PROGRESS NOTES
Chief Complaint    Results (LEFT knee MRI results)      History of Present Illness:  Antonino Trevino is a 50 y.o. male returns today for follow-up on his left knee after receiving an MRI. His symptoms are unchanged. The results of the MRI indicate that he does have a chronic, complex 3 cm tear of the medial meniscus with loose fragment. 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 There is also several loose bodies within the knee. The full MRI report as documented below. Pain Assessment  Location of Pain: Knee  Location Modifiers: Left  Severity of Pain: 8  Quality of Pain: Sharp  Duration of Pain: Persistent  Frequency of Pain: Constant  Aggravating Factors: Walking, Standing, Squatting, Stairs  Limiting Behavior: Some  Relieving Factors: Rest    Medical History:  Past Medical History:   Diagnosis Date    Hyperlipidemia      Patient Active Problem List    Diagnosis Date Noted    Adhesive capsulitis of left shoulder 11/22/2017    Tendinitis of left rotator cuff 11/10/2017    Type 2 diabetes mellitus without complication ([facility]) 97/47/5777    Mixed hyperlipidemia 05/31/2013    Hyperglycemia 05/31/2013     Current Outpatient Medications   Medication Sig Dispense Refill    atorvastatin (LIPITOR) 40 MG tablet Take 1 tablet by mouth daily 90 tablet 1    metFORMIN (GLUCOPHAGE-XR) 500 MG extended release tablet TAKE 2 TABLETS BY MOUTH TWICE A DAY WITH MEALS 360 tablet 0    benzonatate (TESSALON) 200 MG capsule Take 1 capsule by mouth 3 times daily as needed for Cough 30 capsule 1    brimonidine (ALPHAGAN) 0.2 % ophthalmic solution 1 drop 3 times daily      dorzolamide-timolol (COSOPT) 22.3-6.8 MG/ML ophthalmic solution 1 drop 2 times daily      blood glucose monitor strips Dispense what insurance will cover. 100 strip 1    Lancets MISC Dispense lancets insurance will cover.  100 each 1    LUMIGAN 0.01 % SOLN ophthalmic drops   5    Misc. Devices MISC 1 each by Does not apply route once for 1 dose Dispense what patients insurance will cover. 1 Device 0     No current facility-administered medications for this visit. Review of Systems:  Relevant review of systems reviewed and available in the patient's chart    Vital Signs: There were no vitals taken for this visit. General Exam:   Constitutional: Patient is adequately groomed with no evidence of malnutrition  DTRs: Deep tendon reflexes are intact  Mental Status: The patient is oriented to time, place and person. The patient's mood and affect are appropriate. Lymphatic: The lymphatic examination bilaterally reveals all areas to be without enlargement or induration. Vascular: Examination reveals no swelling or calf tenderness. Peripheral pulses are palpable and 2+. Neurological: The patient has good coordination. There is no weakness or sensory deficit. There is no height or weight on file to calculate BMI. Left knee Examination:    Inspection:  No swelling, ecchymosis or deformity    Palpation:  Tenderness to palpation directly over the medial joint line    Range of Motion:  Well-preserved range of motion, 0-120°. There is some crepitation with flexion extension of the knee    Strength:  4+/5 quad and hamstring strength    Special Tests:  Positive Mariia's examination. Stable to varus and valgus stress testing. Negative Lachman's exam    Skin: There are no rashes, ulcerations or lesions. Gait: Mild antalgic gait    Reflex normal deep tendon reflexes    Additional Comments:       Additional Examinations:         Contralateral Exam: Examination of the right knee reveals intact skin. There is no focal tenderness. The patient demonstrates full painless range of motion with regard to flexion and extension. Strength is 5/5 throughout all planes. Ligamentous stability is grossly intact.       Radiology:                   Impression:  Encounter Diagnoses Name Primary?  Complex tear of medial meniscus of left knee as current injury, subsequent encounter Yes    Chondromalacia of knee, left        Office Procedures:  No orders of the defined types were placed in this encounter. Treatment Plan: I have gone over the MRI results with the patient. I discussed the options for treatment including both surgical and nonsurgical recommendations. At this time we would recommend proceeding with a left knee arthroscopy with partial medial meniscectomy, chondroplasty, loose body removal, and lateral work as needed. We discussed the risks, benefits, and complications of arthroscopic knee surgery. The patient realizes that there are concerns with this surgery with respect to infection, deep vein thrombosis, neurological injury, delayed  rehabilitation, the possibility of arthrofibrosis of the knee, and specifically  Hoffa's fat pad fibrosis that can potentially cause difficulties. The patient realizes that there are also anesthetic concerns including cardiopulmonary issues, pulmonary issues, and even possibility of death or dystrophy. The patient voiced understanding to this as well as the normal  rehabilitation  that   is involved with weeks of physical therapy, exercise, and strengthening. The patient also realizes that even though the surgery, from a functional perspective, typically allows the patient to return to good function at about 6 weeks, that it often takes 6 months to completely rehabilitate from this operation. The patient also realizes that if there is an arthritic component to the symptoms, then they may still have some degree of arthritis pain.

## 2020-10-07 NOTE — LETTER
CONSENT TO SURGICAL OR MEDICAL PROCEDURE    PATIENTS NAMES: Anne Loera 1975  50 y.o. [phone] (home) [phone] (work)  DATE/TIME: 10/7/2020 9:28 AM    1) I consent that Dr. Elzbieta Duron perform one or more surgical and or medical procedures on the above named patient at: [facility] [address] to treat the condition(s) which appear indicated by the diagnostic studies already preformed. I have been told in general terms the nature and purpose of the procedure(s) and what the procedure(s) is/are expected to accomplish. They procedure(s) are as follows:   LEFT KNEE ARTHROSCOPY:  PARTIAL MEDIAL MENISECTOMY, CHONDROPLASTY, LATERAL WORK AND LOOSE BODY REMOVAL AS NEEDED  2) It has been explained to me by the informing physician that during the course of any surgical or medical procedure unforeseen condition(s) may be revealed that necessitate an extension of the original procedure(s) or a different procedure(s) than set forth in Paragraph 1. I therefore consent that the above named physician perform such additional or different procedure(s) as are necessary or desirable in the exercise of his professional judgement. 3) I have been made aware by the informing physician of certain reasonably known risks that are associated with the procedure(s) set forth in Paragraph 1.  I understand the reasonably known risk to be: Including but not limited to: CVA, infection, M.I., Phlebitis, Cardiac Arrest and Pulmonary Embolism, Loss of Circulation, Nerve Injury, Delayed Healing, Recurrence, Loss of extremity/digit, R.S.D., Screw breakage, Arthritis, Pain, Swelling, Stiffness, Failure of Prothesis, Fracture, Leg length discrepancy, Wound complication/non-healing, need for further surgery and persistent pain.   4) I have also been informed by the informing physician that there are If patient is unable to sign or is a minor, complete one of the following:   A) Patient is a minor ______________ years of age. B) Patient is unable to sign because_________________________________________________    The undersigned represents that he or she is duly authorized to execute to this consent for and on the behalf of the above named patient.    ________________________________             __________________________________________  Witness                                                                         Parent/Spouse/Guardian/Other:_________________    Medical Record#  Insurance  Smartphone:  Yes   Or   No  Email:                       You have signed a consent to have a total joint replacement surgery performed. Before you can proceed with surgery the following things must be done. Please use this form as a checklist.      _____   Please schedule your Physical Therapy functional evaluation. (Allowed locations are listed on the order)    _____   Please take your lab orders and get your blood work done at a [facility]. (If needed, please use the web site to find the location closest to you:  [email or web address]) . You do not need an appointment and you do not need to fast.    _____  If you did not register in the office, you will need to go to the website for [employer] [email or web address]) to complete registration and the medical questionnaire prior to your physical therapy evaluation. Do not schedule an appointment with your primary care physician until you have a surgery date. This pre-op history and physical has to be within 30 days of the surgery. _____  CT Scan. We will schedule this once your surgery has been scheduled.     _____  Information about the pre-op class, your CT scan, your post-op appointment and cold therapy options will be in your surgery packet that will be mailed to you after you are scheduled for surgery. Once you have completed both the labs and the Physical Therapy evaluation please call Padma Ba @ [phone] to let her know. Once verification of the PT Evaluation and completed labs has been determined you will be called and set up for surgery. This may take 1-2 days to check results and return your phone call. You will not be called about lab results if everything is normal.    Please make sure you have not blocked our number. Padma Ba will call from [phone] / [phone]. Also, please make sure your voice mail is not full.

## 2020-10-13 ENCOUNTER — TELEPHONE (OUTPATIENT)
Dept: ORTHOPEDIC SURGERY | Age: 45
End: 2020-10-13

## 2020-10-13 NOTE — TELEPHONE ENCOUNTER
NEG-[id number]  [facility]-[id number]  LT KNEE  DATE RANGE  10/23/2020 TO 1/21/2021

## 2020-10-15 NOTE — PROGRESS NOTES
Anne Fleeting    Age 50 y.o.    male    1975    MRN [id number]    10/23/2020  Arrival Time_____________  OR Time____________60 [facility]     Procedure(s):  VIDEO ARTHROSCOPY LEFT KNEE, PARTIAL MEDIAL MENISCECTOMY, CHONDROPLASTY, LATERAL WORK AND LOOSE BODY REMOVAL AS NEEDED                      General    Surgeon(s):  Elzbieta Duron, MD       Phone [phone] (home) [phone] (work)    Initals  Date  O[address]ar [address]  Cell Work  _________________________________________________________________  _________________________________________________________________  _________________________________________________________________  _________________________________________________________________  _________________________________________________________________      PCP _____________________________ Phone_________________       H&P__________________Bringing    Chart            Epic  DOS     Called_______  EKG__________________Bringing    Chart            Epic  DOS     Called_______  LAB__________________ Bringing    Chart            Epic  DOS     Called_______  Cardiac Clearance_______Bringing    Chart            Epic      DOS       Called_______    Cardiologist________________________ Phone___________________________      ? Anglican concerns / Waiver on Chart            PAT Communications_____________  ? Pre-op Instructions Given [place]          ______________________________  ? Directions to Surgery Center                          ______________________________  ? Transportation Home_______________      _______________________________  ?  Crutches/Walker__________________        _______________________________      ________Pre-op Orders   _______Transcribed    _______Wt.  ________Pharmacy          _______SCD  ______VTE     ______Beta Blocker  ________Consent             ________TED Jas Shell

## 2020-10-19 ENCOUNTER — OFFICE VISIT (OUTPATIENT)
Dept: FAMILY MEDICINE CLINIC | Age: 45
End: 2020-10-19
Payer: COMMERCIAL

## 2020-10-19 ENCOUNTER — OFFICE VISIT (OUTPATIENT)
Dept: PRIMARY CARE CLINIC | Age: 45
End: 2020-10-19
Payer: COMMERCIAL

## 2020-10-19 VITALS
OXYGEN SATURATION: 98 % | DIASTOLIC BLOOD PRESSURE: 84 MMHG | SYSTOLIC BLOOD PRESSURE: 120 MMHG | WEIGHT: 251.8 LBS | BODY MASS INDEX: 33.22 KG/M2 | HEART RATE: 74 BPM

## 2020-10-19 DIAGNOSIS — Z01.818 PRE-OP EXAMINATION: ICD-10-CM

## 2020-10-19 LAB
A/G RATIO: 2 (ref 1.1–2.2)
ALBUMIN SERPL-MCNC: 4.4 G/DL (ref 3.4–5)
ALP BLD-CCNC: 84 U/L (ref 40–129)
ALT SERPL-CCNC: 52 U/L (ref 10–40)
ANION GAP SERPL CALCULATED.3IONS-SCNC: 12 MMOL/L (ref 3–16)
AST SERPL-CCNC: 35 U/L (ref 15–37)
BILIRUB SERPL-MCNC: 0.5 MG/DL (ref 0–1)
BUN BLDV-MCNC: 13 MG/DL (ref 7–20)
CALCIUM SERPL-MCNC: 9.7 MG/DL (ref 8.3–10.6)
CHLORIDE BLD-SCNC: 102 MMOL/L (ref 99–110)
CO2: 24 MMOL/L (ref 21–32)
CREAT SERPL-MCNC: 0.9 MG/DL (ref 0.9–1.3)
GFR AFRICAN AMERICAN: >60
GFR NON-AFRICAN AMERICAN: >60
GLOBULIN: 2.2 G/DL
GLUCOSE BLD-MCNC: 177 MG/DL (ref 70–99)
POTASSIUM SERPL-SCNC: 4.3 MMOL/L (ref 3.5–5.1)
SODIUM BLD-SCNC: 138 MMOL/L (ref 136–145)
TOTAL PROTEIN: 6.6 G/DL (ref 6.4–8.2)

## 2020-10-19 PROCEDURE — 90686 IIV4 VACC NO PRSV 0.5 ML IM: CPT | Performed by: STUDENT IN AN ORGANIZED HEALTH CARE EDUCATION/TRAINING PROGRAM

## 2020-10-19 PROCEDURE — G8417 CALC BMI ABV UP PARAM F/U: HCPCS | Performed by: NURSE PRACTITIONER

## 2020-10-19 PROCEDURE — 99213 OFFICE O/P EST LOW 20 MIN: CPT | Performed by: STUDENT IN AN ORGANIZED HEALTH CARE EDUCATION/TRAINING PROGRAM

## 2020-10-19 PROCEDURE — 99211 OFF/OP EST MAY X REQ PHY/QHP: CPT | Performed by: NURSE PRACTITIONER

## 2020-10-19 PROCEDURE — 90471 IMMUNIZATION ADMIN: CPT | Performed by: STUDENT IN AN ORGANIZED HEALTH CARE EDUCATION/TRAINING PROGRAM

## 2020-10-19 PROCEDURE — G8428 CUR MEDS NOT DOCUMENT: HCPCS | Performed by: NURSE PRACTITIONER

## 2020-10-19 ASSESSMENT — PATIENT HEALTH QUESTIONNAIRE - PHQ9
1. LITTLE INTEREST OR PLEASURE IN DOING THINGS: 0
SUM OF ALL RESPONSES TO PHQ QUESTIONS 1-9: 0
SUM OF ALL RESPONSES TO PHQ QUESTIONS 1-9: 0
SUM OF ALL RESPONSES TO PHQ9 QUESTIONS 1 & 2: 0
2. FEELING DOWN, DEPRESSED OR HOPELESS: 0
SUM OF ALL RESPONSES TO PHQ QUESTIONS 1-9: 0

## 2020-10-19 NOTE — PROGRESS NOTES
Vaccine Information Sheet, \"Influenza - Inactivated\"  given to Diallo Borja, or parent/legal guardian of  Diallo Borja and verbalized understanding. Patient responses:    Have you ever had a reaction to a flu vaccine? No  Do you have any current illness? No  Have you ever had [place] Syndrome? No  Do you have a serious allergy to any of the follow: Neomycin, Polymyxin, Thimerosal, eggs or egg products? No    Flu vaccine given per order. Please see immunization tab. Risks and benefits explained. Current VIS given.       Immunizations Administered     Name Date Dose Route    Influenza, Quadv, IM, PF (6 mo and older Fluzone, Flulaval, Fluarix, and 3 yrs and older Afluria) 10/19/2020 0.5 mL Intramuscular    Site: Deltoid- Left    Lot: [id number]    NDC: 23666-785-58
perioperatively:      Plan:   1. Preoperative workup as follows electrolytes, creatinine, liver function studies   2. Change in medication regimen before surgery: Hold meds morning of surgery  Pt instructed to avoid NSAIDs, ASA, MV, Vitamin E, Fish oil 1 week prior to surgery to decrease bleeding risk. 3. Prophylaxis for cardiac events with perioperative beta-blockers: not indicated   4. Invasive hemodynamic monitoring perioperatively: not indicated   5. Deep vein thrombosis prophylaxis postoperatively:regimen to be chosen by surgical team   6. Other measures: none      1. Pre-op examination  Patient doing well today. Discussed need for increased compliance with diabetic diet due to risk of poor healing with poor glucose control.  - COMPREHENSIVE METABOLIC PANEL; Future    2. Acute pain of left knee  Plan for surgery          While assessing care for this patient, I have reviewed all pertinent lab work/imaging/ specialist notes and care in reference to those problems addressed above in detail. Appropriate medical decision making was based on this. Please note that portions of this note may have been completed with a voice recognition program. Efforts were made to edit the dictations but occasionally words are mis-transcribed.       Pt is at an acceptable risk for planned procedure    Please call with any questions  Tina Fulton, DO

## 2020-10-19 NOTE — PROGRESS NOTES
Darlin Castillo received a viral test for COVID-19. They were educated on isolation and quarantine as appropriate. For any symptoms, they were directed to seek care from their PCP, given contact information to establish with a doctor, directed to an urgent care or the emergency room.

## 2020-10-19 NOTE — PATIENT INSTRUCTIONS

## 2020-10-19 NOTE — PROGRESS NOTES
Obstructive Sleep Apnea (DEJA) Screening     Patient:  Jonatan Hairston    YOB: 1975      Medical Record #:  [id number]                     Date:  10/19/2020     1. Are you a loud and/or regular snorer? [x]  Yes       [] No    2. Have you been observed to gasp or stop breathing during sleep? []  Yes       [x] No    3. Do you feel tired or groggy upon awakening or do you awaken with a headache?           []  Yes       [x] No    4. Are you often tired or fatigued during the wake time hours? []  Yes       [x] No    5. Do you fall asleep sitting, reading, watching TV or driving? []  Yes       [x] No    6. Do you often have problems with memory or concentration? []  Yes       [x] No    **If patient's score is ? 3 they are considered high risk for DEJA. An Anesthesia provider will evaluate the patient and develop a plan of care the day of surgery. Note:  If the patient's BMI is more than 35 kg m¯² , has neck circumference > 40 cm, and/or high blood pressure the risk is greater (© American Sleep Apnea Association, 2006).

## 2020-10-20 ENCOUNTER — TELEPHONE (OUTPATIENT)
Dept: ORTHOPEDIC SURGERY | Age: 45
End: 2020-10-20

## 2020-10-20 LAB — SARS-COV-2: NOT DETECTED

## 2020-10-20 NOTE — RESULT ENCOUNTER NOTE

## 2020-10-22 ENCOUNTER — ANESTHESIA EVENT (OUTPATIENT)
Dept: OPERATING ROOM | Age: 45
End: 2020-10-22
Payer: COMMERCIAL

## 2020-10-22 NOTE — ANESTHESIA PRE PROCEDURE
Department of Anesthesiology  Preprocedure Note       Name:  Herbie Blount   Age:  50 y.o.  :  1975                                          MRN:  [id number]         Date:  10/23/2020      Surgeon: Salena Lacy MD    Procedure: VIDEO ARTHROSCOPY LEFT KNEE, PARTIAL MEDIAL MENISCECTOMY, CHONDROPLASTY, LATERAL WORK AND LOOSE BODY REMOVAL AS NEEDED    HPI:  50 y.o. male who has had on and off knee pain for a number of years. Reports an injury that he sustained in his left knee over 13 years ago when he stepped down off of a ladder and had sharp medial knee pain. He was able to nurse this along but over the last couple of months his symptoms have been getting worse. He is reporting increased medial knee pain with locking and catching sensation, instability, increased crepitation. He reports to be an intermittent sharp pain over the medial aspect of the knee. He has been wearing a knee brace regularly for the last couple of months, taking over-the-counter oral anti-inflammatories, and was given an independent home exercise program by his primary care physician. He is been doing these exercises for at least the last 8- 9 weeks with little to no improvement.      Medications prior to admission:    atorvastatin (LIPITOR) 40 MG t  Take 1 tablet by mouth daily   metFORMIN (GLUCOPHAGE-XR) 500 MG  TAKE 2 TABLETS BY MOUTH TWICE A DAY WITH MEALS   brimonidine (ALPHAGAN) 0.2 % ophthal soln 1 drop 3 times daily   dorzolamide-timolol (COSOPT) 22.3-6.8 MG/ML ophthal soln 1 drop 2 times daily   LUMIGAN 0.01 % SOLN ophthalmic drops      Allergies:  No Known Allergies    Problem List:     Mixed hyperlipidemia    Hyperglycemia    Type 2 diabetes mellitus without complication (HCC)    Tendinitis of left rotator cuff    Adhesive capsulitis of left shoulder     Past Medical History:     Hyperlipidemia      Past Surgical History:     HERNIA REPAIR      SKIN BIOPSY      basal cell carinoma    TONSILLECTOMY       Social History:    Tobacco Use    Smoking status: Never Smoker    Smokeless tobacco: Never Used   Substance Use Topics    Alcohol use: Yes     Alcohol/week: 3.0 standard drinks     Types: 3 Cans of beer per week     Comment: occ, 1 pop a day, coffee per day     Vital Signs (Current):     BP: 132/86  Pulse: 73    Resp: 16  SpO2: 96    Temp: 97.2 °F (36.2 °C)    Height: 6' 1\" (1.854 m)  (10/23/20)  Weight: 250 lb (113.4 kg)  (10/23/20)    BMI: 33.1            BP Readings from Last 3 Encounters:   10/19/20 120/84   09/26/19 132/86   01/25/19 110/82     NPO Status: >8 hrs                         BMI:   Wt Readings from Last 3 Encounters:   10/19/20 251 lb 12.8 oz (114.2 kg)   10/19/20 250 lb (113.4 kg)   09/23/20 250 lb (113.4 kg)     Body mass index is 32.98 kg/m². CBC:    WBC 7.6 07/20/2018    HGB 14.6 07/20/2018    MCV 93.6 07/20/2018    RDW 13.4 07/20/2018     07/20/2018     CMP:     10/19/2020    K 4.3 10/19/2020     10/19/2020    CO2 24 10/19/2020    BUN 13 10/19/2020    CREATININE 0.9 10/19/2020    GLUCOSE 177 10/19/2020    PROT 6.6 10/19/2020    CALCIUM 9.7 10/19/2020    BILITOT 0.5 10/19/2020    ALKPHOS 84 10/19/2020    AST 35 10/19/2020    ALT 52 10/19/2020     COVID-19 Screening (If Applicable):     COVID19 Not Detected 10/19/2020     Anesthesia Evaluation  Patient summary reviewed and Nursing notes reviewed  Airway: Mallampati: II  TM distance: >3 FB   Neck ROM: full  Mouth opening: > = 3 FB Dental:          Pulmonary: breath sounds clear to auscultation      (-) COPD, asthma, sleep apnea, wheezes and not a current smoker                           Cardiovascular:  Exercise tolerance: good (>4 METS),   (+) hyperlipidemia    (-) hypertension, past MI and murmur      Rhythm: regular  Rate: normal                    Neuro/Psych:      (-) seizures, TIA and CVA           GI/Hepatic/Renal:   (+) morbid obesity     (-) GERD, hepatitis and liver disease       Endo/Other:    (+) DiabetesType II DM, , : arthritis: OA., .                 Abdominal:           Vascular:     - DVT and PE. Anesthesia Plan      general     ASA 3       Induction: intravenous. MIPS: Prophylactic antiemetics administered. Anesthetic plan and risks discussed with patient. Plan discussed with CRNA.             Maryan Rodriguez MD

## 2020-10-23 ENCOUNTER — ANESTHESIA (OUTPATIENT)
Dept: OPERATING ROOM | Age: 45
End: 2020-10-23
Payer: COMMERCIAL

## 2020-10-23 ENCOUNTER — HOSPITAL ENCOUNTER (OUTPATIENT)
Age: 45
Setting detail: OUTPATIENT SURGERY
Discharge: HOME | End: 2020-10-23
Attending: ORTHOPAEDIC SURGERY | Admitting: ORTHOPAEDIC SURGERY
Payer: COMMERCIAL

## 2020-10-23 VITALS
TEMPERATURE: 97.6 F | WEIGHT: 250 LBS | HEIGHT: 73 IN | RESPIRATION RATE: 14 BRPM | DIASTOLIC BLOOD PRESSURE: 76 MMHG | SYSTOLIC BLOOD PRESSURE: 116 MMHG | OXYGEN SATURATION: 95 % | BODY MASS INDEX: 33.13 KG/M2 | HEART RATE: 68 BPM

## 2020-10-23 VITALS
TEMPERATURE: 98 F | OXYGEN SATURATION: 96 % | RESPIRATION RATE: 15 BRPM | DIASTOLIC BLOOD PRESSURE: 91 MMHG | SYSTOLIC BLOOD PRESSURE: 142 MMHG

## 2020-10-23 LAB
GLUCOSE BLD-MCNC: 151 MG/DL (ref 70–99)
GLUCOSE BLD-MCNC: 166 MG/DL (ref 70–99)
PERFORMED ON: ABNORMAL
PERFORMED ON: ABNORMAL

## 2020-10-23 PROCEDURE — 2580000003 HC RX 258: Performed by: ANESTHESIOLOGY

## 2020-10-23 PROCEDURE — 6360000002 HC RX W HCPCS: Performed by: NURSE ANESTHETIST, CERTIFIED REGISTERED

## 2020-10-23 PROCEDURE — 2580000003 HC RX 258: Performed by: ORTHOPAEDIC SURGERY

## 2020-10-23 PROCEDURE — 3700000000 HC ANESTHESIA ATTENDED CARE: Performed by: ORTHOPAEDIC SURGERY

## 2020-10-23 PROCEDURE — 7100000000 HC PACU RECOVERY - FIRST 15 MIN: Performed by: ORTHOPAEDIC SURGERY

## 2020-10-23 PROCEDURE — 7100000011 HC PHASE II RECOVERY - ADDTL 15 MIN: Performed by: ORTHOPAEDIC SURGERY

## 2020-10-23 PROCEDURE — 6360000002 HC RX W HCPCS: Performed by: ORTHOPAEDIC SURGERY

## 2020-10-23 PROCEDURE — 3600000014 HC SURGERY LEVEL 4 ADDTL 15MIN: Performed by: ORTHOPAEDIC SURGERY

## 2020-10-23 PROCEDURE — 7100000010 HC PHASE II RECOVERY - FIRST 15 MIN: Performed by: ORTHOPAEDIC SURGERY

## 2020-10-23 PROCEDURE — 2500000003 HC RX 250 WO HCPCS: Performed by: ORTHOPAEDIC SURGERY

## 2020-10-23 PROCEDURE — 2500000003 HC RX 250 WO HCPCS: Performed by: NURSE ANESTHETIST, CERTIFIED REGISTERED

## 2020-10-23 PROCEDURE — 2709999900 HC NON-CHARGEABLE SUPPLY: Performed by: ORTHOPAEDIC SURGERY

## 2020-10-23 PROCEDURE — 3600000004 HC SURGERY LEVEL 4 BASE: Performed by: ORTHOPAEDIC SURGERY

## 2020-10-23 PROCEDURE — 3700000001 HC ADD 15 MINUTES (ANESTHESIA): Performed by: ORTHOPAEDIC SURGERY

## 2020-10-23 PROCEDURE — 2720000010 HC SURG SUPPLY STERILE: Performed by: ORTHOPAEDIC SURGERY

## 2020-10-23 RX ORDER — HYDRALAZINE HYDROCHLORIDE 20 MG/ML
5 INJECTION INTRAMUSCULAR; INTRAVENOUS EVERY 10 MIN PRN
Status: DISCONTINUED | OUTPATIENT
Start: 2020-10-23 | End: 2020-10-23 | Stop reason: HOSPADM

## 2020-10-23 RX ORDER — FENTANYL CITRATE 50 UG/ML
25 INJECTION, SOLUTION INTRAMUSCULAR; INTRAVENOUS EVERY 5 MIN PRN
Status: DISCONTINUED | OUTPATIENT
Start: 2020-10-23 | End: 2020-10-23 | Stop reason: HOSPADM

## 2020-10-23 RX ORDER — MIDAZOLAM HYDROCHLORIDE 1 MG/ML
INJECTION INTRAMUSCULAR; INTRAVENOUS PRN
Status: DISCONTINUED | OUTPATIENT
Start: 2020-10-23 | End: 2020-10-23 | Stop reason: SDUPTHER

## 2020-10-23 RX ORDER — ONDANSETRON 2 MG/ML
INJECTION INTRAMUSCULAR; INTRAVENOUS PRN
Status: DISCONTINUED | OUTPATIENT
Start: 2020-10-23 | End: 2020-10-23 | Stop reason: SDUPTHER

## 2020-10-23 RX ORDER — FENTANYL CITRATE 50 UG/ML
INJECTION, SOLUTION INTRAMUSCULAR; INTRAVENOUS PRN
Status: DISCONTINUED | OUTPATIENT
Start: 2020-10-23 | End: 2020-10-23 | Stop reason: SDUPTHER

## 2020-10-23 RX ORDER — HYDROCODONE BITARTRATE AND ACETAMINOPHEN 5; 325 MG/1; MG/1
1 TABLET ORAL EVERY 4 HOURS PRN
Qty: 18 TABLET | Refills: 0 | Status: SHIPPED | OUTPATIENT
Start: 2020-10-23 | End: 2020-10-26

## 2020-10-23 RX ORDER — ONDANSETRON 2 MG/ML
4 INJECTION INTRAMUSCULAR; INTRAVENOUS
Status: DISCONTINUED | OUTPATIENT
Start: 2020-10-23 | End: 2020-10-23 | Stop reason: HOSPADM

## 2020-10-23 RX ORDER — PROPOFOL 10 MG/ML
INJECTION, EMULSION INTRAVENOUS PRN
Status: DISCONTINUED | OUTPATIENT
Start: 2020-10-23 | End: 2020-10-23 | Stop reason: SDUPTHER

## 2020-10-23 RX ORDER — LIDOCAINE HYDROCHLORIDE 20 MG/ML
INJECTION, SOLUTION EPIDURAL; INFILTRATION; INTRACAUDAL; PERINEURAL PRN
Status: DISCONTINUED | OUTPATIENT
Start: 2020-10-23 | End: 2020-10-23 | Stop reason: SDUPTHER

## 2020-10-23 RX ORDER — MEPERIDINE HYDROCHLORIDE 50 MG/ML
12.5 INJECTION INTRAMUSCULAR; INTRAVENOUS; SUBCUTANEOUS EVERY 5 MIN PRN
Status: DISCONTINUED | OUTPATIENT
Start: 2020-10-23 | End: 2020-10-23 | Stop reason: HOSPADM

## 2020-10-23 RX ORDER — BUPIVACAINE HYDROCHLORIDE 2.5 MG/ML
INJECTION, SOLUTION INFILTRATION; PERINEURAL PRN
Status: DISCONTINUED | OUTPATIENT
Start: 2020-10-23 | End: 2020-10-23 | Stop reason: ALTCHOICE

## 2020-10-23 RX ORDER — SODIUM CHLORIDE, SODIUM LACTATE, POTASSIUM CHLORIDE, AND CALCIUM CHLORIDE .6; .31; .03; .02 G/100ML; G/100ML; G/100ML; G/100ML
IRRIGANT IRRIGATION PRN
Status: DISCONTINUED | OUTPATIENT
Start: 2020-10-23 | End: 2020-10-23 | Stop reason: ALTCHOICE

## 2020-10-23 RX ORDER — OXYCODONE HYDROCHLORIDE AND ACETAMINOPHEN 5; 325 MG/1; MG/1
2 TABLET ORAL PRN
Status: DISCONTINUED | OUTPATIENT
Start: 2020-10-23 | End: 2020-10-23 | Stop reason: HOSPADM

## 2020-10-23 RX ORDER — PROMETHAZINE HYDROCHLORIDE 25 MG/ML
6.25 INJECTION, SOLUTION INTRAMUSCULAR; INTRAVENOUS
Status: DISCONTINUED | OUTPATIENT
Start: 2020-10-23 | End: 2020-10-23 | Stop reason: HOSPADM

## 2020-10-23 RX ORDER — SODIUM CHLORIDE, SODIUM LACTATE, POTASSIUM CHLORIDE, CALCIUM CHLORIDE 600; 310; 30; 20 MG/100ML; MG/100ML; MG/100ML; MG/100ML
INJECTION, SOLUTION INTRAVENOUS CONTINUOUS
Status: DISCONTINUED | OUTPATIENT
Start: 2020-10-23 | End: 2020-10-23 | Stop reason: HOSPADM

## 2020-10-23 RX ORDER — KETOROLAC TROMETHAMINE 30 MG/ML
INJECTION, SOLUTION INTRAMUSCULAR; INTRAVENOUS PRN
Status: DISCONTINUED | OUTPATIENT
Start: 2020-10-23 | End: 2020-10-23 | Stop reason: SDUPTHER

## 2020-10-23 RX ORDER — OXYCODONE HYDROCHLORIDE AND ACETAMINOPHEN 5; 325 MG/1; MG/1
1 TABLET ORAL PRN
Status: DISCONTINUED | OUTPATIENT
Start: 2020-10-23 | End: 2020-10-23 | Stop reason: HOSPADM

## 2020-10-23 RX ADMIN — LIDOCAINE HYDROCHLORIDE 3 ML: 20 INJECTION, SOLUTION EPIDURAL; INFILTRATION; INTRACAUDAL; PERINEURAL at 10:17

## 2020-10-23 RX ADMIN — FENTANYL CITRATE 50 MCG: 50 INJECTION INTRAMUSCULAR; INTRAVENOUS at 10:17

## 2020-10-23 RX ADMIN — MIDAZOLAM HYDROCHLORIDE 2 MG: 2 INJECTION, SOLUTION INTRAMUSCULAR; INTRAVENOUS at 10:10

## 2020-10-23 RX ADMIN — ONDANSETRON 4 MG: 2 INJECTION INTRAMUSCULAR; INTRAVENOUS at 10:49

## 2020-10-23 RX ADMIN — FENTANYL CITRATE 50 MCG: 50 INJECTION INTRAMUSCULAR; INTRAVENOUS at 10:54

## 2020-10-23 RX ADMIN — PROPOFOL 200 MG: 10 INJECTION, EMULSION INTRAVENOUS at 10:17

## 2020-10-23 RX ADMIN — KETOROLAC TROMETHAMINE 30 MG: 30 INJECTION, SOLUTION INTRAMUSCULAR; INTRAVENOUS at 10:49

## 2020-10-23 RX ADMIN — CEFAZOLIN SODIUM 2 G: 10 INJECTION, POWDER, FOR SOLUTION INTRAVENOUS at 10:13

## 2020-10-23 RX ADMIN — SODIUM CHLORIDE, POTASSIUM CHLORIDE, SODIUM LACTATE AND CALCIUM CHLORIDE: 600; 310; 30; 20 INJECTION, SOLUTION INTRAVENOUS at 09:36

## 2020-10-23 ASSESSMENT — PULMONARY FUNCTION TESTS
PIF_VALUE: 17
PIF_VALUE: 8
PIF_VALUE: 4
PIF_VALUE: 8
PIF_VALUE: 7
PIF_VALUE: 9
PIF_VALUE: 7
PIF_VALUE: 6
PIF_VALUE: 20
PIF_VALUE: 26
PIF_VALUE: 0
PIF_VALUE: 20
PIF_VALUE: 2
PIF_VALUE: 3
PIF_VALUE: 0
PIF_VALUE: 1
PIF_VALUE: 19
PIF_VALUE: 0
PIF_VALUE: 26
PIF_VALUE: 4
PIF_VALUE: 7
PIF_VALUE: 8
PIF_VALUE: 7
PIF_VALUE: 0
PIF_VALUE: 13
PIF_VALUE: 7
PIF_VALUE: 0
PIF_VALUE: 15
PIF_VALUE: 4
PIF_VALUE: 4
PIF_VALUE: 15
PIF_VALUE: 2
PIF_VALUE: 23
PIF_VALUE: 0
PIF_VALUE: 14
PIF_VALUE: 21
PIF_VALUE: 7
PIF_VALUE: 13
PIF_VALUE: 1
PIF_VALUE: 22
PIF_VALUE: 8
PIF_VALUE: 11
PIF_VALUE: 8
PIF_VALUE: 18

## 2020-10-23 ASSESSMENT — PAIN SCALES - GENERAL
PAINLEVEL_OUTOF10: 0

## 2020-10-23 ASSESSMENT — LIFESTYLE VARIABLES: SMOKING_STATUS: 0

## 2020-10-23 ASSESSMENT — PAIN - FUNCTIONAL ASSESSMENT: PAIN_FUNCTIONAL_ASSESSMENT: 0-10

## 2020-10-23 ASSESSMENT — PAIN DESCRIPTION - DESCRIPTORS: DESCRIPTORS: ACHING

## 2020-10-23 NOTE — BRIEF OP NOTE
Brief Postoperative Note      Patient: Piedad Bailey  YOB: 1975  MRN: [id number]    Date of Procedure: 10/23/2020    Pre-Op Diagnosis: LEFT KNEE MEDIAL  MENISCUS TEAR; chondromalacia of the medial compartment; medial plica syndrome LOOSE BODY    Post-Op Diagnosis: Same       Procedure(s):  VIDEO ARTHROSCOPY LEFT KNEE, PARTIAL MEDIAL MENISCECTOMY, CHONDROPLASTY,  LOOSE BODY REMOVAL plica removal    Surgeon(s):  Santos Kamara MD    Assistant:  Surgical Assistant: Judd Neumann    Anesthesia: General    Estimated Blood Loss (mL): Minimal    Complications: None    Specimens:   * No specimens in log *    Implants:  * No implants in log *      Drains: * No LDAs found *    Findings: AS ABOVE    Electronically signed by Santos Kamara MD on 10/23/2020 at 10:52 AM

## 2020-10-23 NOTE — PROGRESS NOTES
POCT      Blood Glucose: 166      (Normal Range 70-99)    PT:      (Normal Range 9.8 - 13)    INR:      (Normal Range 0.86-1.14)

## 2020-10-23 NOTE — PROGRESS NOTES
Wife updated on phone. Discharge instructions reviewed with patient and responsible adult. Discharge instructions signed and copy given with no additional questions. Patient to be discharged home with belongings. Norco script given patient has crutches and knows how to use them.

## 2020-10-28 ENCOUNTER — HOSPITAL ENCOUNTER (OUTPATIENT)
Dept: PHYSICAL THERAPY | Age: 45
Setting detail: THERAPIES SERIES
Discharge: HOME | End: 2020-10-28
Payer: COMMERCIAL

## 2020-10-28 ENCOUNTER — OFFICE VISIT (OUTPATIENT)
Dept: ORTHOPEDIC SURGERY | Age: 45
End: 2020-10-28

## 2020-10-28 PROCEDURE — 97016 VASOPNEUMATIC DEVICE THERAPY: CPT | Performed by: PHYSICAL THERAPIST

## 2020-10-28 PROCEDURE — 97110 THERAPEUTIC EXERCISES: CPT | Performed by: PHYSICAL THERAPIST

## 2020-10-28 PROCEDURE — 97161 PT EVAL LOW COMPLEX 20 MIN: CPT | Performed by: PHYSICAL THERAPIST

## 2020-10-28 PROCEDURE — G0283 ELEC STIM OTHER THAN WOUND: HCPCS | Performed by: PHYSICAL THERAPIST

## 2020-10-28 PROCEDURE — 97140 MANUAL THERAPY 1/> REGIONS: CPT | Performed by: PHYSICAL THERAPIST

## 2020-10-28 PROCEDURE — 99024 POSTOP FOLLOW-UP VISIT: CPT | Performed by: PHYSICIAN ASSISTANT

## 2020-10-28 NOTE — PLAN OF CARE
[facility], [address]  Phone: [phone]  Fax [phone]     Physical Therapy Certification    Dear Referring Practitioner: Junior Gates MD,    We had the pleasure of evaluating the following patient for physical therapy services at [address] [place] [address]. A summary of our findings can be found in the initial assessment below. This includes our plan of care. If you have any questions or concerns regarding these findings, please do not hesitate to contact me at the office phone number checked above.   Thank you for the referral.       Physician Signature:_______________________________Date:__________________  By signing above (or electronic signature), therapists plan is approved by physician    Patient: Ashlie Marie   : 1975   MRN: [id number]  Referring Physician: Referring Practitioner: Junior Gates MD      Evaluation Date: 10/28/2020      Medical Diagnosis Information:  Diagnosis: E17.276K (ICD-10-CM) - Complex tear of medial meniscus of left knee as current injury, subsequent encounter;M94.262 (ICD-10-CM) - Chondromalacia of knee, left;M23.42 (ICD-10-CM) - Bodies, loose, joint, knee, left   Treatment Diagnosis: LEFT knee PMM/Chondroplasty/Plica Excision/Loose Body Removal 10/23/20; L knee pain (M25.562) L knee effusion (M25.462) L knee stiffness (M25.662) difficulty wlaking (R26.2)                                         Insurance information: PT Insurance Information: UHC-no EOB available at [employer] [facility] Contra-indications: DM type II    C-SSRS Triggered by Intake questionnaire (Past 2 wk assessment):   [x] No, Questionnaire did not trigger screening.   [] Yes, Patient intake triggered further evaluation      [] C-SSRS Screening completed  [] PCP notified via Plan of Care  [] Emergency services notified     Latex Allergy:  [x]NO      []YES  Preferred Language for Healthcare:   [x]English       []other:    SUBJECTIVE: Patient stated complaint:Pt reports he had switched to Advil but had to take hydrocodone last night. He reports icing and elevating regularly as well as ankle pumps. He has been walking around about 1/4 mile a few times a day. He is going to the office today and has appts,     Relevant Medical History:past knee injuries; pt is type II DM and it is not well controlled  Functional Disability Index: LEFS (31%)    Height: 6'1\" Weight: 250 lbs  Pain Scale: 3/10  Easing factors: rest ice meds  Provocative factors: walking     Type: []Constant   [x]Intermittent  []Radiating []Localized []other:     Numbness/Tingling: none reported    Occupation/School:     Living Status/Prior Level of Function: Independent with ADLs and IADLs, Pt was walking up to 10 miles a day this summer. He wants to get back to golfing. Pt has 10 steps in his home and is descending step to gait. OBJECTIVE:     ROM LEFT RIGHT   HIP Flex     HIP Abd     HIP Ext     HIP IR     HIP ER     Knee ext GA with QS -8    Knee Flex seated heel slide 90    Ankle PF     Ankle DF     Ankle In     Ankle Ev     Strength  LEFT RIGHT   HIP Flexors     HIP Abductors     HIP Ext     Hip ER     Knee EXT (quad)     Knee Flex (HS)     Ankle DF     Ankle PF     Ankle Inv     Ankle EV     Quad tone fair    Circumference (cm) IP 40  MP 43  SP 45          Reflexes/Sensation: NT this visit   []Dermatomes/Myotomes intact    [x]Reflexes equal and normal bilaterally   []Other:    Joint mobility: patella   []Normal    [x]Hypo   []Hyper    Palpation: ballottable patella, mild TTP, joint effusion    Observation: Patient's dressing were removed and incisions inspected. Care of post op incisions including bathing restrictions were discussed with patient. Functional Mobility/Transfers: WNL    Posture: WNL    Bandages/Dressings/Incisions: Patient's dressing were removed and incisions inspected.   Care of post op incisions including bathing restrictions were discussed with patient. Gait: (include devices/WB status) antalgic no AD    Orthopedic Special Tests: Homans (-)                       [x] Patient history, allergies, meds reviewed. Medical chart reviewed. See intake form. Review Of Systems (ROS):  [x]Performed Review of systems (Integumentary, CardioPulmonary, Neurological) by intake and observation. Intake form has been scanned into medical record. Patient has been instructed to contact their primary care physician regarding ROS issues if not already being addressed at this time. Co-morbidities/Complexities (which will affect course of rehabilitation):   []None           Arthritic conditions   []Rheumatoid arthritis (M05.9)  [x]Osteoarthritis (M19.91)   Cardiovascular conditions   []Hypertension (I10)  []Hyperlipidemia (E78.5)  []Angina pectoris (I20)  []Atherosclerosis (I70)   Musculoskeletal conditions   []Disc pathology   []Congenital spine pathologies   []Prior surgical intervention  []Osteoporosis (M81.8)  []Osteopenia (M85.8)   Endocrine conditions   []Hypothyroid (E03.9)  []Hyperthyroid Gastrointestinal conditions   []Constipation (I74.35)   Metabolic conditions   []Morbid obesity (E66.01)  [x]Diabetes type 1(E10.65) or 2 (E11.65)   []Neuropathy (G60.9)     Pulmonary conditions   []Asthma (J45)  []Coughing   []COPD (J44.9)   Psychological Disorders  []Anxiety (F41.9)  []Depression (F32.9)   []Other:   []Other:          Barriers to/and or personal factors that will affect rehab potential:              []Age  []Sex              [x]Motivation/Lack of Motivation                        [x]Co-Morbidities              []Cognitive Function, education/learning barriers              []Environmental, home barriers              []profession/work barriers  []past PT/medical experience  [x]other:  Justification: DM causes delayed healing especially in the case of uncontrolled sugar.  Pt was overall 20 mins late starting appt due to late arrival. He is pushing himself a bit much this early but verbalized understanding of precautions. Falls Risk Assessment (30 days): no falls reported  [x] Falls Risk assessed and no intervention required. [] Falls Risk assessed and Patient requires intervention due to being higher risk   TUG score (>12s at risk):     [] Falls education provided, including           ASSESSMENT: Pt education was provided in post op restrictions, precautions, progression of rehab expectations and timeline. It was reinforced that he is to NOT take his steri strips off. Functional Impairments:     []Noted lumbar/proximal hip/LE joint hypomobility   [x]Decreased LE functional ROM   [x]Decreased core/proximal hip strength and neuromuscular control   [x]Decreased LE functional strength   []Reduced balance/proprioceptive control   []other:      Functional Activity Limitations (from functional questionnaire and intake)   [x]Reduced ability to tolerate prolonged functional positions   [x]Reduced ability or difficulty with changes of positions or transfers between positions   [x]Reduced ability to maintain good posture and demonstrate good body mechanics with sitting, bending, and lifting   [x]Reduced ability to sleep   [x] Reduced ability or tolerance with driving and/or computer work   [x]Reduced ability to perform lifting, carrying tasks   [x]Reduced ability to squat   [x]Reduced ability to forward bend   [x]Reduced ability to ambulate prolonged functional periods/distances/surfaces   [x]Reduced ability to ascend/descend stairs   [x]Reduced ability to run, hop, cut or jump   []other:    Participation Restrictions   []Reduced participation in self care activities   [x]Reduced participation in home management activities   [x]Reduced participation in work activities   [x]Reduced participation in social activities. [x]Reduced participation in sport/recreation activities.     Classification : [x]Signs/symptoms consistent with post-surgical status including decreased ROM, strength and function. []Signs/symptoms consistent with joint sprain/strain   []Signs/symptoms consistent with patella-femoral syndrome   []Signs/symptoms consistent with knee OA/hip OA   []Signs/symptoms consistent with internal derangement of knee/Hip   []Signs/symptoms consistent with functional hip weakness/NMR control      []Signs/symptoms consistent with tendinitis/tendinosis    []signs/symptoms consistent with pathology which may benefit from Dry needling      []other:      Prognosis/Rehab Potential:      []Excellent   [x]Good    []Fair   []Poor    Tolerance of evaluation/treatment:    []Excellent   [x]Good    []Fair   []Poor  Physical Therapy Evaluation Complexity Justification  [x] A history of present problem with:  [] no personal factors and/or comorbidities that impact the plan of care;  [x]1-2 personal factors and/or comorbidities that impact the plan of care  []3 personal factors and/or comorbidities that impact the plan of care  [x] An examination of body systems using standardized tests and measures addressing any of the following: body structures and functions (impairments), activity limitations, and/or participation restrictions;:  [x] a total of 1-2 or more elements   [] a total of 3 or more elements   [] a total of 4 or more elements   [x] A clinical presentation with:  [x] stable and/or uncomplicated characteristics   [] evolving clinical presentation with changing characteristics  [] unstable and unpredictable characteristics;   [x] Clinical decision making of [x] low, [] moderate, [] high complexity using standardized patient assessment instrument and/or measurable assessment of functional outcome.     [x] EVAL (LOW) 89095 (typically 20 minutes face-to-face)  [] EVAL (MOD) 72386 (typically 30 minutes face-to-face)  [] EVAL (HIGH) 96890 (typically 45 minutes face-to-face)  [] RE-EVAL       PLAN: Frequency/Duration:  2 days per week for 8 Weeks:  Interventions:  [x]  Therapeutic exercise including: strength training, ROM, for Lower extremity and core   [x]  NMR activation and proprioception for LE, Glutes and Core   [x]  Manual therapy as indicated for LE, Hip and spine to include: Dry Needling/IASTM, STM, PROM, Gr I-IV mobilizations, manipulation. [x] Modalities as needed that may include: thermal agents, E-stim, Biofeedback, US, iontophoresis as indicated  [x] Patient education on joint protection, postural re-education, activity modification, progression of HEP. HEP instruction:   HEP instruction was provided and handouts given to include:  (see scanned forms)  Access Code: [email or web address]   Patient Portal: [employer]/       GOALS:  Patient stated goal: to play golf  [] Progressing: [] Met: [] Not Met: [] Adjusted    Therapist goals for Patient:   Short Term Goals: To be achieved in: 2 weeks  1. Independent in HEP and progression per patient tolerance, in order to prevent re-injury. [] Progressing: [] Met: [] Not Met: [] Adjusted  2. Patient will have a decrease in pain to facilitate improvement in movement, function, and ADLs as indicated by Functional Deficits. [] Progressing: [] Met: [] Not Met: [] Adjusted    Long Term Goals: To be achieved in: 8 weeks  1. Disability index score of 15% or less for the LEFS to assist with reaching prior level of function. [] Progressing: [] Met: [] Not Met: [] Adjusted  2. Patient will demonstrate increased AROM to 0-125 to allow for proper joint functioning as indicated by patients Functional Deficits. [] Progressing: [] Met: [] Not Met: [] Adjusted  3. Patient will demonstrate an increase in Strength to good proximal hip strength and control, 4+/5 in LE to allow for proper functional mobility as indicated by patients Functional Deficits. [] Progressing: [] Met: [] Not Met: [] Adjusted  4.  Patient will return to household functional activities without increased symptoms or restriction. [] Progressing: [] Met: [] Not Met: [] Adjusted  5.  Pt will be able to play 9 holes of golf. (patient specific functional goal)    [] Progressing: [] Met: [] Not Met: [] Adjusted     Electronically signed by:  Laura Hill, [id number] [facility]

## 2020-10-28 NOTE — FLOWSHEET NOTE
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 (ICD-10-CM) - Chondromalacia of knee, left;M23.42 (ICD-10-CM) - Bodies, loose, joint, knee, left  · Treatment Diagnosis: LEFT knee PMM/Chondroplasty/Plica Excision/Loose Body Removal 10/23/20; L knee pain (M25.562) L knee effusion (M25.462) L knee stiffness (M25.662) difficulty wlaking (O35.9)  Insurance/Certification information:  PT Insurance Information: [facility]-no EOB available at [address] [place] [address] Avenue:  Referring Practitioner: Junior Gates MD    Date of Patient follow up with Physician and OP note due: 10/29/20    Latex Allergy/Pacemaker/Adhesive allergy:  [x]NO      []YES      Is this a Progress Report:     []  Yes  [x]  No      If Yes:  Date Range for reporting period:  Beginning:10/28/20  Ending:    Progress report will be due (10 Rx or 30 days ):        Recertification will be due (POC Duration  / 90 days ): 20     Has the plan of care been signed (Y/N):        []  Yes  [x]  No         Visit # Date Range Insurance Allowable Requires auth   1 10/23/20 [facility] no EOB (1)    []no        []yes:           SUBJECTIVE:  Pt reports he had switched to Advil but had to take hydrocodone last night. He reports icing and elevating regularly as well as ankle pumps. He has been walking around    Pain level:  3/10 See eval    OBJECTIVE: See eval   Observation: edema (girth CM) IP: 40   MP:43    SP:45   Pt has removed his bandages and dressings and showered twice already.     PT Practice Pattern:D  Co morbidities:1-2  SURgical: x  1    [] Other:  [] TA x      [] [facility] (67631[facility]  [] ES(attended) (14127)      [x] ES (un) (85010):     GOALS:     Patient stated goal: to play golf  []? Progressing: []? Met: []? Not Met: []? Adjusted     Therapist goals for Patient:   Short Term Goals: To be achieved in: 2 weeks  1. Independent in HEP and progression per patient tolerance, in order to prevent re-injury. []? Progressing: []? Met: []? Not Met: []? Adjusted  2. Patient will have a decrease in pain to facilitate improvement in movement, function, and ADLs as indicated by Functional Deficits. []? Progressing: []? Met: []? Not Met: []? Adjusted     Long Term Goals: To be achieved in: 8 weeks  1. Disability index score of 15% or less for the LEFS to assist with reaching prior level of function. []? Progressing: []? Met: []? Not Met: []? Adjusted  2. Patient will demonstrate increased AROM to 0-125 to allow for proper joint functioning as indicated by patients Functional Deficits. []? Progressing: []? Met: []? Not Met: []? Adjusted  3. Patient will demonstrate an increase in Strength to good proximal hip strength and control, 4+/5 in LE to allow for proper functional mobility as indicated by patients Functional Deficits. []? Progressing: []? Met: []? Not Met: []? Adjusted  4. Patient will return to household functional activities without increased symptoms or restriction. []? Progressing: []? Met: []? Not Met: []? Adjusted  5. Pt will be able to play 9 holes of golf. (patient specific functional goal)    []? Progressing: []? Met: []? Not Met: []? Adjusted           ASSESSMENT:  See eval      Overall Progression Towards Functional goals/ Treatment Progress Update:  [] Patient is progressing as expected towards functional goals listed. [] Progression is slowed due to complexities/Impairments listed. [] Progression has been slowed due to co-morbidities.   [x] Plan just implemented, too soon to assess goals progression <30days   [] Goals require adjustment due to lack of progress  [] Patient is not progressing as expected and requires additional follow up with physician  [] Other    Prognosis for POC: [x] Good [] Fair  [] Poor      Patient requires continued skilled intervention: [x] Yes  [] No    Treatment/Activity Tolerance:  [x] Patient able to complete treatment  [] Patient limited by fatigue  [] Patient limited by pain    [] Patient limited by other medical complications  [] Other:             PLAN: See eval  [] Continue per plan of care [] Alter current plan (see comments above)  [x] Plan of care initiated [] Hold pending MD visit [] Discharge      Electronically signed by:  Ady Lopez, [place] [id number]    Note: If patient does not return for scheduled/ recommended follow up visits, this note will serve as a discharge from care along with most recent update on progress.

## 2020-10-28 NOTE — PROGRESS NOTES
History of present illness: The patient returns today for their first postoperative visit after knee arthroscopy. Pain control has been satisfactory with oral medications. There have been no fevers or chills. Pain Assessment  Location of Pain: Knee  Location Modifiers: Left  Severity of Pain: 3  Quality of Pain: Aching, Dull  Duration of Pain: Persistent  Frequency of Pain: Intermittent  Aggravating Factors: Bending, Straightening, Standing, Squatting, Walking, Stairs  Limiting Behavior: Some  Relieving Factors: Rest, Ice, Nsaids]    Physical examination: Inspection reveals expected swelling. Incisions are clean, dry, and intact. No signs of infection. Range of motion limited by pain and swelling. There is no calf pain or signs of DVT with a negative Homans sign. Assessment/plan: The patient is doing well after knee arthroscopy. Surgical findings were reviewed today and pictures were discussed with the patient and they were provided a copy. I have recommended ice, judicious use of the NSAIDs with GI precautions, and physical therapy to diminish swelling and restore both range of motion and strength. We will see the patient back in 3-4 weeks. The patient verbalized good understanding of the plan.

## 2020-11-04 ENCOUNTER — HOSPITAL ENCOUNTER (OUTPATIENT)
Dept: PHYSICAL THERAPY | Age: 45
Setting detail: THERAPIES SERIES
Discharge: HOME | End: 2020-11-04
Payer: COMMERCIAL

## 2020-11-04 PROCEDURE — 97110 THERAPEUTIC EXERCISES: CPT | Performed by: PHYSICAL THERAPIST

## 2020-11-04 PROCEDURE — 97016 VASOPNEUMATIC DEVICE THERAPY: CPT | Performed by: PHYSICAL THERAPIST

## 2020-11-04 NOTE — FLOWSHEET NOTE
[facility], [address]  Phone: [phone]  Fax [phone]    Physical Therapy Treatment Note/ Progress Report:           Date:  2020    Patient Name:  Tia Prader    :  1975  MRN: [id number]    Medical/Treatment Diagnosis Information:  · Diagnosis: S83.232D (ICD-10-CM) - Complex tear of medial meniscus of left knee as current injury, subsequent encounter;M94.262 (ICD-10-CM) - Chondromalacia of knee, left;M23.42 (ICD-10-CM) - Bodies, loose, joint, knee, left  · Treatment Diagnosis: LEFT knee PMM/Chondroplasty/Plica Excision/Loose Body Removal 10/23/20; L knee pain (M25.562) L knee effusion (M25.462) L knee stiffness (M25.662) difficulty wlaking (D35.8)  Insurance/Certification information:  PT Insurance Information: [facility]-no EOB available at [address] [place] [address] Avenue:  Referring Practitioner: Arnulfo Eckert MD    Date of Patient follow up with Physician and OP note due: 10/29/20    Latex Allergy/Pacemaker/Adhesive allergy:  [x]NO      []YES      Is this a Progress Report:     []  Yes  [x]  No      If Yes:  Date Range for reporting period:  Beginning:10/28/20  Ending:    Progress report will be due (10 Rx or 30 days ):        Recertification will be due (POC Duration  / 90 days ): 20     Has the plan of care been signed (Y/N):        [x]  Yes  []  No         Visit # Date Range Insurance Allowable Requires auth   2 10/23/20 [facility] 60 v    [x]no        []yes:           SUBJECTIVE:  Pt reports he has not been doing his exercises because he didn't feel he needed to. He is walking around and doing ankle pumps. He is concerned with mowing the yard and blowing leaves.     Pain level:  2/10     OBJECTIVE: See below   Observation: edema (girth CM) IP: 39   MP:42    SP:43      PT Practice Pattern:D  Co morbidities:1-2  SURgical: 10/23/20 L PMM  INITIAL VISIT 10/28/20 CURRENT VISIT 11/4/20 motor activation to allow for proper function of core, proximal hip and LE with self care and ADLs  [] (21820) Gait Re-education- Provided training and instruction to the patient for proper LE, core and proximal hip recruitment and positioning and eccentric body weight control with ambulation re-education including up and down stairs     Home Exercise Program:    [x] (13540) Reviewed/Progressed HEP activities related to strengthening, flexibility, endurance, ROM of core, proximal hip and LE for functional self-care, mobility, lifting and ambulation/stair navigation   [] (67846)Reviewed/Progressed HEP activities related to improving balance, coordination, kinesthetic sense, posture, motor skill, proprioception of core, proximal hip and LE for self care, mobility, lifting, and ambulation/stair navigation      Manual Treatments:  PROM / STM / Oscillations-Mobs:  G-I, II, III, IV (PA's, Inf., Post.)  [x] (00937) Provided manual therapy to mobilize LE, proximal hip and/or LS spine soft tissue/joints for the purpose of modulating pain, promoting relaxation,  increasing ROM, reducing/eliminating soft tissue swelling/inflammation/restriction, improving soft tissue extensibility and allowing for proper ROM for normal function with self care, mobility, lifting and ambulation. Trigger point Dry Needling:  fine needle insertion into myofascial trigger points to stimulate a healing response  [] (37766) Needle insertion without injection: 1 or 2 muscles  [] (12615) Needle insertion without injection: 3 or more muscles    Modalities:     [x] GAME READY (VASO)- for significant edema, swelling, pain control.   x15'   [] ESU (HV/PM) for edema and pain control       Charges:  Timed Code Treatment Minutes: 45   Total Treatment Minutes: 60       [] EVAL (LOW) 06747 (typically 20 minutes face-to-face)  [] EVAL (MOD) 32788 (typically 30 minutes face-to-face)  [] EVAL (HIGH) 53817 (typically 45 minutes face-to-face)  [] RE-EVAL     [x] LV(71884) x  3   [] IONTO  [] NMR (81059) x     [x] VASO  [] Manual (34369) x      [] Other:  [] TA x      [] Mech Traction (63114)  [] ES(attended) (09758)      [] ES (un) (44163):     GOALS:     Patient stated goal: to play golf  [x]? Progressing: []? Met: []? Not Met: []? Adjusted     Therapist goals for Patient:   Short Term Goals: To be achieved in: 2 weeks  1. Independent in HEP and progression per patient tolerance, in order to prevent re-injury. []? Progressing: []? Met: []? Not Met: []? Adjusted  2. Patient will have a decrease in pain to facilitate improvement in movement, function, and ADLs as indicated by Functional Deficits. []? Progressing: []? Met: []? Not Met: []? Adjusted     Long Term Goals: To be achieved in: 8 weeks  1. Disability index score of 15% or less for the LEFS to assist with reaching prior level of function. []? Progressing: []? Met: []? Not Met: []? Adjusted  2. Patient will demonstrate increased AROM to 0-125 to allow for proper joint functioning as indicated by patients Functional Deficits. []? Progressing: []? Met: []? Not Met: []? Adjusted  3. Patient will demonstrate an increase in Strength to good proximal hip strength and control, 4+/5 in LE to allow for proper functional mobility as indicated by patients Functional Deficits. []? Progressing: []? Met: []? Not Met: []? Adjusted  4. Patient will return to household functional activities without increased symptoms or restriction. []? Progressing: []? Met: []? Not Met: []? Adjusted  5. Pt will be able to play 9 holes of golf. (patient specific functional goal)    []? Progressing: []? Met: []? Not Met: []? Adjusted           ASSESSMENT:  Poor quality of quad contraction. Unable to perform SLRF without substitution  from R glute lift. Requires consistent verbal cuing to perform therex correctly. Pt has not been to PT in 1 week and has not improved with quad endurance or contraction due to not performing HEP.       Overall Progression Towards Functional goals/ Treatment Progress Update:  [] Patient is progressing as expected towards functional goals listed. [] Progression is slowed due to complexities/Impairments listed. [] Progression has been slowed due to co-morbidities. [x] Plan just implemented, too soon to assess goals progression <30days   [] Goals require adjustment due to lack of progress  [] Patient is not progressing as expected and requires additional follow up with physician  [] Other    Prognosis for POC: [x] Good [] Fair  [] Poor      Patient requires continued skilled intervention: [x] Yes  [] No    Treatment/Activity Tolerance:  [x] Patient able to complete treatment  [] Patient limited by fatigue  [] Patient limited by pain    [] Patient limited by other medical complications  [] Other:             PLAN: See eval   [x] Continue per plan of care [] Alter current plan (see comments above)  [] Plan of care initiated [] Hold pending MD visit [] Discharge      Electronically signed by:  Lori Naik, PT [id number]    Note: If patient does not return for scheduled/ recommended follow up visits, this note will serve as a discharge from care along with most recent update on progress.

## 2020-11-06 ENCOUNTER — HOSPITAL ENCOUNTER (OUTPATIENT)
Dept: PHYSICAL THERAPY | Age: 45
Setting detail: THERAPIES SERIES
Discharge: HOME | End: 2020-11-06
Payer: COMMERCIAL

## 2020-11-06 PROCEDURE — 97110 THERAPEUTIC EXERCISES: CPT

## 2020-11-06 PROCEDURE — 97016 VASOPNEUMATIC DEVICE THERAPY: CPT

## 2020-11-06 NOTE — FLOWSHEET NOTE
[facility], [address]  Phone: [phone]  Fax [phone]    Physical Therapy Treatment Note/ Progress Report:           Date:  2020    Patient Name:  Declan Gomez    :  1975  MRN: [id number]    Medical/Treatment Diagnosis Information:  · Diagnosis: S83.232D (ICD-10-CM) - Complex tear of medial meniscus of left knee as current injury, subsequent encounter;M94.262 (ICD-10-CM) - Chondromalacia of knee, left;M23.42 (ICD-10-CM) - Bodies, loose, joint, knee, left  · Treatment Diagnosis: LEFT knee PMM/Chondroplasty/Plica Excision/Loose Body Removal 10/23/20; L knee pain (M25.562) L knee effusion (M25.462) L knee stiffness (M25.662) difficulty wlaking (N09.7)  Insurance/Certification information:  PT Insurance Information: [facility]-no EOB available at [address] [place] [address] Avenue:  Referring Practitioner: Tariq Latif MD    Date of Patient follow up with Physician and OP note due: 10/29/20    Latex Allergy/Pacemaker/Adhesive allergy:  [x]NO      []YES      Is this a Progress Report:     []  Yes  [x]  No      If Yes:  Date Range for reporting period:  Beginning:10/28/20  Ending:    Progress report will be due (10 Rx or 30 days ):        Recertification will be due (POC Duration  / 90 days ): 20     Has the plan of care been signed (Y/N):        [x]  Yes  []  No         Visit # Date Range Insurance Allowable Requires auth   3 10/23/20 [facility] 60 v    [x]no        []yes:           SUBJECTIVE:  Pt reports no significant pain in his knee. He is doing his exercises more regularly and icing each night.      Pain level:  0/10     OBJECTIVE: See below   Observation: edema (girth CM) IP: 39   MP:42    SP:43      PT Practice Pattern:D  Co morbidities:1-2  SURgical: 10/23/20 L PMM  INITIAL VISIT 10/28/20 CURRENT VISIT 20   PAIN 0-10/10 3/10 2/10   FUNCTIONAL SCALE LEFS (55) 31%    ROM KE -8 GA/QS -6 to allow for proper function of core, proximal hip and LE with self care and ADLs  [] (47248) Gait Re-education- Provided training and instruction to the patient for proper LE, core and proximal hip recruitment and positioning and eccentric body weight control with ambulation re-education including up and down stairs     Home Exercise Program:    [x] (50644) Reviewed/Progressed HEP activities related to strengthening, flexibility, endurance, ROM of core, proximal hip and LE for functional self-care, mobility, lifting and ambulation/stair navigation   [] (19933)Reviewed/Progressed HEP activities related to improving balance, coordination, kinesthetic sense, posture, motor skill, proprioception of core, proximal hip and LE for self care, mobility, lifting, and ambulation/stair navigation      Manual Treatments:  PROM / STM / Oscillations-Mobs:  G-I, II, III, IV (PA's, Inf., Post.)  [x] (21592) Provided manual therapy to mobilize LE, proximal hip and/or LS spine soft tissue/joints for the purpose of modulating pain, promoting relaxation,  increasing ROM, reducing/eliminating soft tissue swelling/inflammation/restriction, improving soft tissue extensibility and allowing for proper ROM for normal function with self care, mobility, lifting and ambulation. Trigger point Dry Needling:  fine needle insertion into myofascial trigger points to stimulate a healing response  [] (55723) Needle insertion without injection: 1 or 2 muscles  [] (67892) Needle insertion without injection: 3 or more muscles    Modalities:     [x] GAME READY (VASO)- for significant edema, swelling, pain control.   x15'   [] ESU (HV/PM) for edema and pain control       Charges:  Timed Code Treatment Minutes: 40'   Total Treatment Minutes: 54'       [] EVAL (LOW) 455 1011 (typically 20 minutes face-to-face)  [] EVAL (MOD) 42361 (typically 30 minutes face-to-face)  [] EVAL (HIGH) 99545 (typically 45 minutes face-to-face)  [] RE-EVAL     [x] VIOLETA(65272) x  3 [] IONTO  [] NMR (75286) x     [x] VASO  [] Manual (73501) x      [] Other:  [] TA x      [] Mech Traction (57385)  [] ES(attended) (35844)      [] ES (un) (17757):     GOALS:     Patient stated goal: to play golf  [x]? Progressing: []? Met: []? Not Met: []? Adjusted     Therapist goals for Patient:   Short Term Goals: To be achieved in: 2 weeks  1. Independent in HEP and progression per patient tolerance, in order to prevent re-injury. []? Progressing: []? Met: []? Not Met: []? Adjusted  2. Patient will have a decrease in pain to facilitate improvement in movement, function, and ADLs as indicated by Functional Deficits. []? Progressing: []? Met: []? Not Met: []? Adjusted     Long Term Goals: To be achieved in: 8 weeks  1. Disability index score of 15% or less for the LEFS to assist with reaching prior level of function. []? Progressing: []? Met: []? Not Met: []? Adjusted  2. Patient will demonstrate increased AROM to 0-125 to allow for proper joint functioning as indicated by patients Functional Deficits. []? Progressing: []? Met: []? Not Met: []? Adjusted  3. Patient will demonstrate an increase in Strength to good proximal hip strength and control, 4+/5 in LE to allow for proper functional mobility as indicated by patients Functional Deficits. []? Progressing: []? Met: []? Not Met: []? Adjusted  4. Patient will return to household functional activities without increased symptoms or restriction. []? Progressing: []? Met: []? Not Met: []? Adjusted  5. Pt will be able to play 9 holes of golf. (patient specific functional goal)    []? Progressing: []? Met: []? Not Met: []? Adjusted           ASSESSMENT:  Patient is demonstrating improving quad activation this date. His ROM is progressing well and he was able to progress hip and quad strengthening exercises. Overall Progression Towards Functional goals/ Treatment Progress Update:  [] Patient is progressing as expected towards functional goals listed. [] Progression is slowed due to complexities/Impairments listed. [] Progression has been slowed due to co-morbidities. [x] Plan just implemented, too soon to assess goals progression <30days   [] Goals require adjustment due to lack of progress  [] Patient is not progressing as expected and requires additional follow up with physician  [] Other    Prognosis for POC: [x] Good [] Fair  [] Poor      Patient requires continued skilled intervention: [x] Yes  [] No    Treatment/Activity Tolerance:  [x] Patient able to complete treatment  [] Patient limited by fatigue  [] Patient limited by pain    [] Patient limited by other medical complications  [] Other:             PLAN: See eval. Cont L knee ROM and functional quad strengthening. [x] Continue per plan of care [] Alter current plan (see comments above)  [] Plan of care initiated [] Hold pending MD visit [] Discharge      Electronically signed by:  Gudelia Wells, PT , DPT, [facility] [id number]    Note: If patient does not return for scheduled/ recommended follow up visits, this note will serve as a discharge from care along with most recent update on progress.

## 2020-11-09 ENCOUNTER — HOSPITAL ENCOUNTER (OUTPATIENT)
Dept: PHYSICAL THERAPY | Age: 45
Setting detail: THERAPIES SERIES
Discharge: HOME | End: 2020-11-09
Payer: COMMERCIAL

## 2020-11-09 PROCEDURE — 97140 MANUAL THERAPY 1/> REGIONS: CPT

## 2020-11-09 PROCEDURE — 97016 VASOPNEUMATIC DEVICE THERAPY: CPT

## 2020-11-09 PROCEDURE — 97110 THERAPEUTIC EXERCISES: CPT

## 2020-11-09 NOTE — FLOWSHEET NOTE
[facility], [address]  Phone: [phone]  Fax [phone]    Physical Therapy Treatment Note/ Progress Report:           Date:  2020    Patient Name:  Bryan Nelson    :  1975  MRN: [id number]    Medical/Treatment Diagnosis Information:  · Diagnosis: S83.232D (ICD-10-CM) - Complex tear of medial meniscus of left knee as current injury, subsequent encounter;M94.262 (ICD-10-CM) - Chondromalacia of knee, left;M23.42 (ICD-10-CM) - Bodies, loose, joint, knee, left  · Treatment Diagnosis: LEFT knee PMM/Chondroplasty/Plica Excision/Loose Body Removal 10/23/20; L knee pain (M25.562) L knee effusion (M25.462) L knee stiffness (M25.662) difficulty wlaking (D30.5)  Insurance/Certification information:  PT Insurance Information: [facility]-no EOB available at [address] [place] [address] Avenue:  Referring Practitioner: Evan Solis MD    Date of Patient follow up with Physician and OP note due: 10/29/20    Latex Allergy/Pacemaker/Adhesive allergy:  [x]NO      []YES      Is this a Progress Report:     []  Yes  [x]  No      If Yes:  Date Range for reporting period:  Beginning:10/28/20  Ending:    Progress report will be due (10 Rx or 30 days ):        Recertification will be due (POC Duration  / 90 days ): 20     Has the plan of care been signed (Y/N):        [x]  Yes  []  No         Visit # Date Range Insurance Allowable Requires auth   4 10/23/20 [facility] 60 v    [x]no        []yes:           SUBJECTIVE:  Pt reports his L knee has felt okay overall. He did yard work over the weekend and had some swelling following but overall he feels he is doing better.      Pain level:  0/10     OBJECTIVE: See below   Observation: edema (girth CM) IP: 39   MP:42    SP:43      PT Practice Pattern:D  Co morbidities:1-2  SURgical: 10/23/20 L PMM  INITIAL VISIT 10/28/20 CURRENT VISIT 20   PAIN 0-10/10 3/10 2/10   FUNCTIONAL SCALE LEFS (55) 31%    ROM KE -8 GA/QS -6 GA/QS    KF 90 seated  seated HS                           STRENGHT (MMT) Quad tone fair Fair     KE NT     KF NT     HF NT     HAB NT     HER NT          RESTRICTIONS/PRECAUTIONS: DMII sugar not well controlled    Exercises/Interventions:     HEP:HEP instruction was provided and handouts given to include:  (see scanned forms)  Access Code: [email or web address]   Patient Portal: [employer]/        Therapeutic Ex (50020) Reps/Sets/time Notes/CUES HEP   3x30\"  X    3x30\"  X    Seated QS 10x10\"  X    SLRF 2x10x3\" 2# X     10x5\"  X    2x10x3\" instructed to perform 10 reps X 11/4   1x10x3\" VC/MC X 11/4   HL ADD set with bridges 10x3  X 11/4   Incline gastroc stretch LLE 5x30\"      10\"x10     HS stretch on step 30\"x3     Supine HF stretch 30\"x3     ABD bridges OVL 2x10      SB bridges 2x10  VC for form     Clamshells R/L 2x15     Rev clamshells R/L 2x10 ea     Sb squats 2x10            Recumbent bike for ROM 5'     Pt education   HEP importance and following directions, ICE          Manual Intervention ([id number])      IASTM L quads, Patellar mobs, PROM L knee 8'                               NMR re-education (75909)  CUES NEEDED                                                          Therapeutic Activity (92620)                                                Therapeutic Exercise and NMR EXR  [x] (91528) Provided verbal/tactile cueing for activities related to strengthening, flexibility, endurance, ROM for improvements in LE, proximal hip, and core control with self care, mobility, lifting, ambulation.  [] (24768) Provided verbal/tactile cueing for activities related to improving balance, coordination, kinesthetic sense, posture, motor skill, proprioception  to assist with LE, proximal hip, and core control in self care, mobility, lifting, ambulation and eccentric single leg control.      NMR and Therapeutic Activities:    [] (20585 or 96085) Provided verbal/tactile cueing for activities related to improving balance, coordination, kinesthetic sense, posture, motor skill, proprioception and motor activation to allow for proper function of core, proximal hip and LE with self care and ADLs  [] (56422) Gait Re-education- Provided training and instruction to the patient for proper LE, core and proximal hip recruitment and positioning and eccentric body weight control with ambulation re-education including up and down stairs     Home Exercise Program:    [x] (35699) Reviewed/Progressed HEP activities related to strengthening, flexibility, endurance, ROM of core, proximal hip and LE for functional self-care, mobility, lifting and ambulation/stair navigation   [] (21155)Reviewed/Progressed HEP activities related to improving balance, coordination, kinesthetic sense, posture, motor skill, proprioception of core, proximal hip and LE for self care, mobility, lifting, and ambulation/stair navigation      Manual Treatments:  PROM / STM / Oscillations-Mobs:  G-I, II, III, IV (PA's, Inf., Post.)  [x] (26177) Provided manual therapy to mobilize LE, proximal hip and/or LS spine soft tissue/joints for the purpose of modulating pain, promoting relaxation,  increasing ROM, reducing/eliminating soft tissue swelling/inflammation/restriction, improving soft tissue extensibility and allowing for proper ROM for normal function with self care, mobility, lifting and ambulation. Trigger point Dry Needling:  fine needle insertion into myofascial trigger points to stimulate a healing response  [] (27817) Needle insertion without injection: 1 or 2 muscles  [] (09341) Needle insertion without injection: 3 or more muscles    Modalities:     [x] GAME READY (VASO)- for significant edema, swelling, pain control.   x15'   [] ESU (HV/PM) for edema and pain control       Charges:  Timed Code Treatment Minutes: 39'   Total Treatment Minutes: 61'       [] EVAL (LOW) 455 1011 (typically 20 minutes face-to-face)  [] EVAL (MOD) 90986 (typically 30 minutes face-to-face)  [] EVAL (HIGH) 04072 (typically 45 minutes face-to-face)  [] RE-EVAL     [x] RANDY(05236) x  2   [] IONTO  [] NMR (49971) x     [x] VASO  [x] Manual (08886) x 1     [] Other:  [] TA x      [] Mech Traction (90118)  [] ES(attended) (16789)      [] ES (un) (69669):     GOALS:     Patient stated goal: to play golf  [x]? Progressing: []? Met: []? Not Met: []? Adjusted     Therapist goals for Patient:   Short Term Goals: To be achieved in: 2 weeks  1. Independent in HEP and progression per patient tolerance, in order to prevent re-injury. []? Progressing: []? Met: []? Not Met: []? Adjusted  2. Patient will have a decrease in pain to facilitate improvement in movement, function, and ADLs as indicated by Functional Deficits. []? Progressing: []? Met: []? Not Met: []? Adjusted     Long Term Goals: To be achieved in: 8 weeks  1. Disability index score of 15% or less for the LEFS to assist with reaching prior level of function. []? Progressing: []? Met: []? Not Met: []? Adjusted  2. Patient will demonstrate increased AROM to 0-125 to allow for proper joint functioning as indicated by patients Functional Deficits. []? Progressing: []? Met: []? Not Met: []? Adjusted  3. Patient will demonstrate an increase in Strength to good proximal hip strength and control, 4+/5 in LE to allow for proper functional mobility as indicated by patients Functional Deficits. []? Progressing: []? Met: []? Not Met: []? Adjusted  4. Patient will return to household functional activities without increased symptoms or restriction. []? Progressing: []? Met: []? Not Met: []? Adjusted  5. Pt will be able to play 9 holes of golf. (patient specific functional goal)    []? Progressing: []? Met: []? Not Met: []? Adjusted           ASSESSMENT:  Patient is progressing well and tolerated CKC strengthening this date without pain.  He fatigues quickly with strengthening exercises and would benefit from further CKC and functional strengthening ex. Overall Progression Towards Functional goals/ Treatment Progress Update:  [] Patient is progressing as expected towards functional goals listed. [] Progression is slowed due to complexities/Impairments listed. [] Progression has been slowed due to co-morbidities. [x] Plan just implemented, too soon to assess goals progression <30days   [] Goals require adjustment due to lack of progress  [] Patient is not progressing as expected and requires additional follow up with physician  [] Other    Prognosis for POC: [x] Good [] Fair  [] Poor      Patient requires continued skilled intervention: [x] Yes  [] No    Treatment/Activity Tolerance:  [x] Patient able to complete treatment  [] Patient limited by fatigue  [] Patient limited by pain    [] Patient limited by other medical complications  [] Other:             PLAN: See eval. Cont L knee ROM and functional quad strengthening. [x] Continue per plan of care [] Alter current plan (see comments above)  [] Plan of care initiated [] Hold pending MD visit [] Discharge      Electronically signed by:  Alicia Doe, PT , DPT, OCS [id number]    Note: If patient does not return for scheduled/ recommended follow up visits, this note will serve as a discharge from care along with most recent update on progress.

## 2020-11-13 ENCOUNTER — HOSPITAL ENCOUNTER (OUTPATIENT)
Dept: PHYSICAL THERAPY | Age: 45
Setting detail: THERAPIES SERIES
Discharge: HOME | End: 2020-11-13
Payer: COMMERCIAL

## 2020-11-13 PROCEDURE — 97140 MANUAL THERAPY 1/> REGIONS: CPT

## 2020-11-13 PROCEDURE — 97110 THERAPEUTIC EXERCISES: CPT

## 2020-11-13 PROCEDURE — 97016 VASOPNEUMATIC DEVICE THERAPY: CPT

## 2020-11-13 NOTE — FLOWSHEET NOTE
-8 GA/QS -2 supine heel prop    KF 90 seated  supine HS                           STRENGHT (MMT) Quad tone fair Fair     KE NT     KF NT     HF NT     HAB NT     HER NT          RESTRICTIONS/PRECAUTIONS: DMII sugar not well controlled    Exercises/Interventions:     HEP:HEP instruction was provided and handouts given to include:  (see scanned forms)  Access Code: [email or web address]   Patient Portal: [employer]/        Therapeutic Ex (08646) Reps/Sets/time Notes/CUES HEP   3x30\"  X    3x30\"  X     10x10\"  X    SLRF 2x10x3\" 2# X     10x5\"  X    2x10x3\" instructed to perform 10 reps X 11/4   1x10x3\" VC/MC X 11/4    10x3  X 11/4   Incline gastroc stretch LLE 5x30\"      10\"x10     HS stretch on step 30\"x3     Supine HF stretch 30\"x3     ABD bridges OVL 2x10      SB bridges 2x10  VC for form     Clamshells R/L 2x15     Rev clamshells R/L 2x10 ea     SB squats 2x10           LBW OVL15'x3     LP: DL, 2up 1 down 3x10 85#               Upright bike for warm up 5'     Pt education   HEP importance and following directions, ICE          Manual Intervention ([id number])      IASTM L quads, Patellar mobs, PROM L knee 8'                               NMR re-education (49816)  CUES NEEDED                                                          Therapeutic Activity (21822)                                                Therapeutic Exercise and NMR EXR  [x] (55711) Provided verbal/tactile cueing for activities related to strengthening, flexibility, endurance, ROM for improvements in LE, proximal hip, and core control with self care, mobility, lifting, ambulation.  [] (36907) Provided verbal/tactile cueing for activities related to improving balance, coordination, kinesthetic sense, posture, motor skill, proprioception  to assist with LE, proximal hip, and core control in self care, mobility, lifting, ambulation and eccentric single leg control.      NMR and Therapeutic Activities:    [] (13967 or 25292) Provided verbal/tactile cueing for activities related to improving balance, coordination, kinesthetic sense, posture, motor skill, proprioception and motor activation to allow for proper function of core, proximal hip and LE with self care and ADLs  [] (26240) Gait Re-education- Provided training and instruction to the patient for proper LE, core and proximal hip recruitment and positioning and eccentric body weight control with ambulation re-education including up and down stairs     Home Exercise Program:    [x] (20936) Reviewed/Progressed HEP activities related to strengthening, flexibility, endurance, ROM of core, proximal hip and LE for functional self-care, mobility, lifting and ambulation/stair navigation   [] (28200)Reviewed/Progressed HEP activities related to improving balance, coordination, kinesthetic sense, posture, motor skill, proprioception of core, proximal hip and LE for self care, mobility, lifting, and ambulation/stair navigation      Manual Treatments:  PROM / STM / Oscillations-Mobs:  G-I, II, III, IV (PA's, Inf., Post.)  [x] (02961) Provided manual therapy to mobilize LE, proximal hip and/or LS spine soft tissue/joints for the purpose of modulating pain, promoting relaxation,  increasing ROM, reducing/eliminating soft tissue swelling/inflammation/restriction, improving soft tissue extensibility and allowing for proper ROM for normal function with self care, mobility, lifting and ambulation. Trigger point Dry Needling:  fine needle insertion into myofascial trigger points to stimulate a healing response  [] (46620) Needle insertion without injection: 1 or 2 muscles  [] (39184) Needle insertion without injection: 3 or more muscles    Modalities:     [x] GAME READY (VASO)- for significant edema, swelling, pain control.   x15'   [] ESU (HV/PM) for edema and pain control       Charges:  Timed Code Treatment Minutes: 40'   Total Treatment Minutes: 54'       [] EVAL (LOW) 455 1011 (typically 20 minutes face-to-face)  [] EVAL (MOD) 43698 (typically 30 minutes face-to-face)  [] EVAL (HIGH) 36853 (typically 45 minutes face-to-face)  [] RE-EVAL     [x] IX(35596) x  2   [] IONTO  [] NMR (29508) x     [x] VASO  [x] Manual (84918) x 1     [] Other:  [] TA x      [] Mech Traction (48198)  [] ES(attended) (31364)      [] ES (un) (30323):     GOALS:     Patient stated goal: to play golf  [x]? Progressing: []? Met: []? Not Met: []? Adjusted     Therapist goals for Patient:   Short Term Goals: To be achieved in: 2 weeks  1. Independent in HEP and progression per patient tolerance, in order to prevent re-injury. []? Progressing: []? Met: []? Not Met: []? Adjusted  2. Patient will have a decrease in pain to facilitate improvement in movement, function, and ADLs as indicated by Functional Deficits. []? Progressing: []? Met: []? Not Met: []? Adjusted     Long Term Goals: To be achieved in: 8 weeks  1. Disability index score of 15% or less for the LEFS to assist with reaching prior level of function. []? Progressing: []? Met: []? Not Met: []? Adjusted  2. Patient will demonstrate increased AROM to 0-125 to allow for proper joint functioning as indicated by patients Functional Deficits. []? Progressing: []? Met: []? Not Met: []? Adjusted  3. Patient will demonstrate an increase in Strength to good proximal hip strength and control, 4+/5 in LE to allow for proper functional mobility as indicated by patients Functional Deficits. []? Progressing: []? Met: []? Not Met: []? Adjusted  4. Patient will return to household functional activities without increased symptoms or restriction. []? Progressing: []? Met: []? Not Met: []? Adjusted  5. Pt will be able to play 9 holes of golf. (patient specific functional goal)    []? Progressing: []? Met: []? Not Met: []? Adjusted           ASSESSMENT:  Patient is demonstrating improving L knee ROM with near full extension start of session.  He tolerated progression of hip and quad strengthening well without issue. Pt was provided updated HEP. Overall Progression Towards Functional goals/ Treatment Progress Update:  [] Patient is progressing as expected towards functional goals listed. [] Progression is slowed due to complexities/Impairments listed. [] Progression has been slowed due to co-morbidities. [x] Plan just implemented, too soon to assess goals progression <30days   [] Goals require adjustment due to lack of progress  [] Patient is not progressing as expected and requires additional follow up with physician  [] Other    Prognosis for POC: [x] Good [] Fair  [] Poor      Patient requires continued skilled intervention: [x] Yes  [] No    Treatment/Activity Tolerance:  [x] Patient able to complete treatment  [] Patient limited by fatigue  [] Patient limited by pain    [] Patient limited by other medical complications  [] Other:             PLAN: See eval. Cont L knee ROM and functional quad strengthening. [x] Continue per plan of care [] Alter current plan (see comments above)  [] Plan of care initiated [] Hold pending MD visit [] Discharge      Electronically signed by:  Eleanor Valdovinos, PT , DPT, [facility] [id number]    Note: If patient does not return for scheduled/ recommended follow up visits, this note will serve as a discharge from care along with most recent update on progress.

## 2020-11-16 ENCOUNTER — HOSPITAL ENCOUNTER (OUTPATIENT)
Dept: PHYSICAL THERAPY | Age: 45
Setting detail: THERAPIES SERIES
Discharge: HOME | End: 2020-11-16
Payer: COMMERCIAL

## 2020-11-16 PROCEDURE — 97016 VASOPNEUMATIC DEVICE THERAPY: CPT | Performed by: PHYSICAL THERAPIST

## 2020-11-16 PROCEDURE — 97110 THERAPEUTIC EXERCISES: CPT | Performed by: PHYSICAL THERAPIST

## 2020-11-16 PROCEDURE — 97140 MANUAL THERAPY 1/> REGIONS: CPT | Performed by: PHYSICAL THERAPIST

## 2020-11-16 NOTE — FLOWSHEET NOTE
[facility], [address]  Phone: [phone]  Fax [phone]    Physical Therapy Treatment Note/ Progress Report:           Date:  2020    Patient Name:  Declan Gomez    :  1975  MRN: [id number]    Medical/Treatment Diagnosis Information:  · Diagnosis: S83.232D (ICD-10-CM) - Complex tear of medial meniscus of left knee as current injury, subsequent encounter;M94.262 (ICD-10-CM) - Chondromalacia of knee, left;M23.42 (ICD-10-CM) - Bodies, loose, joint, knee, left  · Treatment Diagnosis: LEFT knee PMM/Chondroplasty/Plica Excision/Loose Body Removal 10/23/20; L knee pain (M25.562) L knee effusion (M25.462) L knee stiffness (M25.662) difficulty wlaking (H59.5)  Insurance/Certification information:  PT Insurance Information: [facility]-no EOB available at [address] [place] [address] Avenue:  Referring Practitioner: Tariq Latif MD    Date of Patient follow up with Physician and OP note due: none scheduled     Latex Allergy/Pacemaker/Adhesive allergy:  [x]NO      []YES      Is this a Progress Report:     []  Yes  [x]  No      If Yes:  Date Range for reporting period:  Beginning:10/28/20  Ending:    Progress report will be due (10 Rx or 30 days ): 61       Recertification will be due (POC Duration  / 90 days ): 20     Has the plan of care been signed (Y/N):        [x]  Yes  []  No         Visit # Date Range Insurance Allowable Requires auth   6 10/23/20 [facility] 60 v    [x]no        []yes:           SUBJECTIVE:    Pt reports feeling good, did well with last visit. Pain level:  210 medial knee    OBJECTIVE: See below   Observation: min TTP and some small scar formation B portals.  Quad tone still diminished L versus R.      PT Practice Pattern:D  Co morbidities:1-2  SURgical: 10/23/20 L PMM  INITIAL VISIT 10/28/20 CURRENT VISIT 20   PAIN 0-10/10 3/10 2/10   FUNCTIONAL SCALE LEFS (55) 31%    ROM KE -8 GA/QS     KF 90 seated HS                            STRENGHT (MMT) Quad tone fair Fair     KE NT 4/5*    KF NT 5/5    HF NT 4/5    HAB NT 4/5    HER NT 4+/5         RESTRICTIONS/PRECAUTIONS: DMII sugar not well controlled    Exercises/Interventions:     HEP:HEP instruction was provided and handouts given to include:  (see scanned forms)  Access Code: [email or web address]   Patient Portal: [email or web address]/        Therapeutic Ex (38725) Reps/Sets/time Notes/CUES HEP   3x30\"  X    3x30\"  X     10x10\"  X    2# X     10x5\"  X    2x10x3\" instructed to perform 10 reps X 11/4   1x10x3\" VC/MC X 11/4    10x3  X 11/4   piriformis stretch LLE 5x20\"  X 11/16   Prone quad stretch 3x20\" With black TB issued X 11/16         Incline gastroc stretch LLE 5x30\"      10\"x10     HS stretch on mariana stretch 30\"x3             VC for form                         85#    HUSAM  HAB R/L  HF R/L   (45#) 2x10  (45#) 2x10     Recumbent bike for ROM5' L3         Pt education   HEP importance and following directions, ICE          Manual Intervention (87546)      Scar massage portals, patellar mobs, prone quad stretch LLE 8'                               NMR re-education (75896)  CUES NEEDED                                                          Therapeutic Activity (01608)                                                Therapeutic Exercise and NMR EXR  [x] (94961) Provided verbal/tactile cueing for activities related to strengthening, flexibility, endurance, ROM for improvements in LE, proximal hip, and core control with self care, mobility, lifting, ambulation.  [] (52760) Provided verbal/tactile cueing for activities related to improving balance, coordination, kinesthetic sense, posture, motor skill, proprioception  to assist with LE, proximal hip, and core control in self care, mobility, lifting, ambulation and eccentric single leg control.      NMR and Therapeutic Activities:    [] (11275 or ) Provided verbal/tactile cueing for activities related to improving balance, coordination, kinesthetic sense, posture, motor skill, proprioception and motor activation to allow for proper function of core, proximal hip and LE with self care and ADLs  [] (31846) Gait Re-education- Provided training and instruction to the patient for proper LE, core and proximal hip recruitment and positioning and eccentric body weight control with ambulation re-education including up and down stairs     Home Exercise Program:    [x] (51954) Reviewed/Progressed HEP activities related to strengthening, flexibility, endurance, ROM of core, proximal hip and LE for functional self-care, mobility, lifting and ambulation/stair navigation   [] (45843)Reviewed/Progressed HEP activities related to improving balance, coordination, kinesthetic sense, posture, motor skill, proprioception of core, proximal hip and LE for self care, mobility, lifting, and ambulation/stair navigation      Manual Treatments:  PROM / STM / Oscillations-Mobs:  G-I, II, III, IV (PA's, Inf., Post.)  [x] (73569) Provided manual therapy to mobilize LE, proximal hip and/or LS spine soft tissue/joints for the purpose of modulating pain, promoting relaxation,  increasing ROM, reducing/eliminating soft tissue swelling/inflammation/restriction, improving soft tissue extensibility and allowing for proper ROM for normal function with self care, mobility, lifting and ambulation. Trigger point Dry Needling:  fine needle insertion into myofascial trigger points to stimulate a healing response  [] (68769) Needle insertion without injection: 1 or 2 muscles  [] (02892) Needle insertion without injection: 3 or more muscles    Modalities:     [x] GAME READY (VASO)- for significant edema, swelling, pain control.   x15' (pt requested)   [] ESU (HV/PM) for edema and pain control       Charges:  Timed Code Treatment Minutes: 40'   Total Treatment Minutes: 54'       [] EVAL (LOW) 455 1011 (typically 20 minutes face-to-face)  [] EVAL (MOD) 83801 (typically 30 minutes face-to-face)  [] EVAL (HIGH) 50809 (typically 45 minutes face-to-face)  [] RE-EVAL     [x] XX(07093) x  2   [] IONTO  [] NMR (15175) x     [x] VASO  [x] Manual (03714) x 1     [] Other:  [] TA x      [] Mech Traction (69385)  [] ES(attended) (63705)      [] ES (un) (69009):     GOALS:     Patient stated goal: to play golf  [x]? Progressing: []? Met: []? Not Met: []? Adjusted     Therapist goals for Patient:   Short Term Goals: To be achieved in: 2 weeks  1. Independent in HEP and progression per patient tolerance, in order to prevent re-injury. []? Progressing: []? Met: []? Not Met: []? Adjusted  2. Patient will have a decrease in pain to facilitate improvement in movement, function, and ADLs as indicated by Functional Deficits. []? Progressing: []? Met: []? Not Met: []? Adjusted     Long Term Goals: To be achieved in: 8 weeks  1. Disability index score of 15% or less for the LEFS to assist with reaching prior level of function. [x]? Progressing: []? Met: []? Not Met: []? Adjusted  2. Patient will demonstrate increased AROM to 0-125 to allow for proper joint functioning as indicated by patients Functional Deficits. []? Progressing: [x]? Met: []? Not Met: []? Adjusted  3. Patient will demonstrate an increase in Strength to good proximal hip strength and control, 4+/5 in LE to allow for proper functional mobility as indicated by patients Functional Deficits. []? Progressing: []? Met: []? Not Met: []? Adjusted  4. Patient will return to household functional activities without increased symptoms or restriction. [x]? Progressing: []? Met: []? Not Met: []? Adjusted  5. Pt will be able to play 9 holes of golf. (patient specific functional goal)    []? Progressing: []? Met: []? Not Met: []? Adjusted           ASSESSMENT:  Tight piriformis is causing ER at knee and medial knee pain.   Pt tolerated all challenges this visit and would benefit from skilled intervention for 2-3 further weeks prior to DC to HEP to increase function, strength, and quad control. Overall Progression Towards Functional goals/ Treatment Progress Update:  [x] Patient is progressing as expected towards functional goals listed. [] Progression is slowed due to complexities/Impairments listed. [] Progression has been slowed due to co-morbidities. [] Plan just implemented, too soon to assess goals progression <30days   [] Goals require adjustment due to lack of progress  [] Patient is not progressing as expected and requires additional follow up with physician  [] Other    Prognosis for POC: [x] Good [] Fair  [] Poor      Patient requires continued skilled intervention: [x] Yes  [] No    Treatment/Activity Tolerance:  [x] Patient able to complete treatment  [] Patient limited by fatigue  [] Patient limited by pain    [] Patient limited by other medical complications  [] Other:             PLAN: See eval. Cont L knee  functional quad strengthening and overall LE endurance and stability for 3 weeks then DC to HEP. [x] Continue per plan of care [] Alter current plan (see comments above)  [] Plan of care initiated [] Hold pending MD visit [] Discharge      Electronically signed by:  Deanne Lord, PT , [id number]  Note: If patient does not return for scheduled/ recommended follow up visits, this note will serve as a discharge from care along with most recent update on progress.

## 2020-11-18 ENCOUNTER — HOSPITAL ENCOUNTER (OUTPATIENT)
Dept: PHYSICAL THERAPY | Age: 45
Setting detail: THERAPIES SERIES
Discharge: HOME | End: 2020-11-18
Payer: COMMERCIAL

## 2020-11-18 PROCEDURE — 97140 MANUAL THERAPY 1/> REGIONS: CPT | Performed by: PHYSICAL THERAPIST

## 2020-11-18 PROCEDURE — 97110 THERAPEUTIC EXERCISES: CPT | Performed by: PHYSICAL THERAPIST

## 2020-11-18 NOTE — FLOWSHEET NOTE
[facility], [address]  Phone: [phone]  Fax [phone]    Physical Therapy Treatment Note/ Progress Report:           Date:  2020    Patient Name:  Sharifa Day    :  1975  MRN: [id number]    Medical/Treatment Diagnosis Information:  · Diagnosis: S83.232D (ICD-10-CM) - Complex tear of medial meniscus of left knee as current injury, subsequent encounter;M94.262 (ICD-10-CM) - Chondromalacia of knee, left;M23.42 (ICD-10-CM) - Bodies, loose, joint, knee, left  · Treatment Diagnosis: LEFT knee PMM/Chondroplasty/Plica Excision/Loose Body Removal 10/23/20; L knee pain (M25.562) L knee effusion (M25.462) L knee stiffness (M25.662) difficulty wlaking (E72.1)  Insurance/Certification information:  PT Insurance Information: [facility]-no EOB available at [address] [place] [address] Avenue:  Referring Practitioner: Aminata Emmanuel MD    Date of Patient follow up with Physician and OP note due: none scheduled     Latex Allergy/Pacemaker/Adhesive allergy:  [x]NO      []YES      Is this a Progress Report:     []  Yes  [x]  No      If Yes:  Date Range for reporting period:  Beginning:10/28/20  Ending:    Progress report will be due (10 Rx or 30 days ):        Recertification will be due (POC Duration  / 90 days ): 20     Has the plan of care been signed (Y/N):        [x]  Yes  []  No         Visit # Date Range Insurance Allowable Requires auth   7 10/23/20 [facility] 60 v    [x]no        []yes:           SUBJECTIVE:    Pt reports feeling good, did well with last visit. Likes the new exercises.     Pain level:  0/10 no medial pain    OBJECTIVE: See below   Observation: tight ITB, min to no TTP portals      PT Practice Pattern:D  Co morbidities:1-2  SURgical: 10/23/20 L PMM  INITIAL VISIT 10/28/20 CURRENT VISIT 20   PAIN 0-10/10 3/10 2/10   FUNCTIONAL SCALE LEFS (55) 31%    ROM KE -8 GA/QS     KF 90 seated HS STRENGHT (MMT) Quad tone fair Fair     KE NT 4/5*    KF NT 5/5    HF NT 4/5    HAB NT 4/5    HER NT 4+/5         RESTRICTIONS/PRECAUTIONS: DMII sugar not well controlled    Exercises/Interventions:     HEP:HEP instruction was provided and handouts given to include:  (see scanned forms)  Access Code: [email or web address]   Patient Portal: [email or web address]/        Therapeutic Ex (68537) Reps/Sets/time Notes/CUES HEP   3x30\"  X    3x30\"  X     10x10\"  X    2# X     10x5\"  X    2x10x3\" instructed to perform 10 reps X 11/4   1x10x3\" VC/MC X 11/4    10x3  X 11/4   piriformis stretch LLE (figure 4) 5x15\"  X 11/16   With black TB issued X 11/16   Pro-slant 1/2 foam roll R.L calf and hip stretch 3x30\" ea VC         10\"x10     HS stretch on mariana stretch R/L 30\"x3 ea VC for form            VC for form                 Prone HSC LLE (2#) 2x10 Focus on TA/GS and ECC and slow speed        LP:  BLE with ABD RVL  BLE with ADD at knees  Up 2 down 1 L/R   (100#) 15x  (100#) 15x  (85#) 15x ea     HUSAM  HAB R/L  HF R/L   (45#) 3x10  (45#) 3x10     Recumbent bike for ROM6' L3 Warm up        Pt education             Manual Intervention (14793)      Scar massage portals, patellar mobs, prone quad stretch LLE, ITB roller, L hip mobs ant GII 8'                               NMR re-education (24988)  CUES NEEDED                                                          Therapeutic Activity (70843)                                                Therapeutic Exercise and NMR EXR  [x] (88558) Provided verbal/tactile cueing for activities related to strengthening, flexibility, endurance, ROM for improvements in LE, proximal hip, and core control with self care, mobility, lifting, ambulation.  [] (78263) Provided verbal/tactile cueing for activities related to improving balance, coordination, kinesthetic sense, posture, motor skill, proprioception  to assist with LE, proximal hip, and core control in self care, mobility, control       Charges:  Timed Code Treatment Minutes: 54'   Total Treatment Minutes: 72'       [] EVAL (LOW) 455 1011 (typically 20 minutes face-to-face)  [] EVAL (MOD) 88036 (typically 30 minutes face-to-face)  [] EVAL (HIGH) 58094 (typically 45 minutes face-to-face)  [] RE-EVAL     [x] MA(79163) x  3   [] IONTO  [] NMR (29877) x     [x] VASO  [x] Manual (78902) x 1     [] Other:  [] TA x      [] Mech Traction (09499)  [] ES(attended) (30426)      [] ES (un) (46183):     GOALS:     Patient stated goal: to play golf  [x]? Progressing: []? Met: []? Not Met: []? Adjusted     Therapist goals for Patient:   Short Term Goals: To be achieved in: 2 weeks  1. Independent in HEP and progression per patient tolerance, in order to prevent re-injury. []? Progressing: []? Met: []? Not Met: []? Adjusted  2. Patient will have a decrease in pain to facilitate improvement in movement, function, and ADLs as indicated by Functional Deficits. []? Progressing: []? Met: []? Not Met: []? Adjusted     Long Term Goals: To be achieved in: 8 weeks  1. Disability index score of 15% or less for the LEFS to assist with reaching prior level of function. [x]? Progressing: []? Met: []? Not Met: []? Adjusted  2. Patient will demonstrate increased AROM to 0-125 to allow for proper joint functioning as indicated by patients Functional Deficits. []? Progressing: [x]? Met: []? Not Met: []? Adjusted  3. Patient will demonstrate an increase in Strength to good proximal hip strength and control, 4+/5 in LE to allow for proper functional mobility as indicated by patients Functional Deficits. []? Progressing: []? Met: []? Not Met: []? Adjusted  4. Patient will return to household functional activities without increased symptoms or restriction. [x]? Progressing: []? Met: []? Not Met: []? Adjusted  5. Pt will be able to play 9 holes of golf. (patient specific functional goal)    []? Progressing: []? Met: []? Not Met: []?  Adjusted ASSESSMENT:  Pt describes muscle fatigue but no pain in knees with today's visit. He was able to keep better form this visit with less cuing on weights. Overall Progression Towards Functional goals/ Treatment Progress Update:  [x] Patient is progressing as expected towards functional goals listed. [] Progression is slowed due to complexities/Impairments listed. [] Progression has been slowed due to co-morbidities. [] Plan just implemented, too soon to assess goals progression <30days   [] Goals require adjustment due to lack of progress  [] Patient is not progressing as expected and requires additional follow up with physician  [] Other    Prognosis for POC: [x] Good [] Fair  [] Poor      Patient requires continued skilled intervention: [x] Yes  [] No    Treatment/Activity Tolerance:  [x] Patient able to complete treatment  [] Patient limited by fatigue  [] Patient limited by pain    [] Patient limited by other medical complications  [] Other:             PLAN: See eval. Cont L knee  functional quad strengthening and overall LE endurance and stability for 3 weeks then DC to HEP. [x] Continue per plan of care [] Alter current plan (see comments above)  [] Plan of care initiated [] Hold pending MD visit [] Discharge      Electronically signed by:  Jessica Goodwin, PT , [id number]  Note: If patient does not return for scheduled/ recommended follow up visits, this note will serve as a discharge from care along with most recent update on progress.

## 2020-11-23 ENCOUNTER — HOSPITAL ENCOUNTER (OUTPATIENT)
Dept: PHYSICAL THERAPY | Age: 45
Setting detail: THERAPIES SERIES
Discharge: HOME | End: 2020-11-23
Payer: COMMERCIAL

## 2020-11-23 PROCEDURE — 97110 THERAPEUTIC EXERCISES: CPT

## 2020-11-23 PROCEDURE — 97112 NEUROMUSCULAR REEDUCATION: CPT

## 2020-11-23 NOTE — FLOWSHEET NOTE
[facility], [address]  Phone: [phone]  Fax [phone]    Physical Therapy Treatment Note/ Progress Report:           Date:  2020    Patient Name:  Donte Kennedy    :  1975  MRN: [id number]    Medical/Treatment Diagnosis Information:  · Diagnosis: S83.232D (ICD-10-CM) - Complex tear of medial meniscus of left knee as current injury, subsequent encounter;M94.262 (ICD-10-CM) - Chondromalacia of knee, left;M23.42 (ICD-10-CM) - Bodies, loose, joint, knee, left  · Treatment Diagnosis: LEFT knee PMM/Chondroplasty/Plica Excision/Loose Body Removal 10/23/20; L knee pain (M25.562) L knee effusion (M25.462) L knee stiffness (M25.662) difficulty wlaking (Q43.5)  Insurance/Certification information:  PT Insurance Information: [facility]-no EOB available at [address] [place] [address] Avenue:  Referring Practitioner: Margie Grajeda MD    Date of Patient follow up with Physician and OP note due: none scheduled     Latex Allergy/Pacemaker/Adhesive allergy:  [x]NO      []YES      Is this a Progress Report:     []  Yes  [x]  No      If Yes:  Date Range for reporting period:  Beginning:10/28/20  Ending:    Progress report will be due (10 Rx or 30 days ):        Recertification will be due (POC Duration  / 90 days ): 20     Has the plan of care been signed (Y/N):        [x]  Yes  []  No         Visit # Date Range Insurance Allowable Requires auth   8 10/23/20 [facility] 60 v    [x]no        []yes:           SUBJECTIVE:    Pt reports no issues since his last session.     Pain level:  0/10 no medial pain    OBJECTIVE: See below   Observation: tight ITB, min to no TTP portals      PT Practice Pattern:D  Co morbidities:1-2  SURgical: 10/23/20 L PMM  INITIAL VISIT 10/28/20 CURRENT VISIT 20   PAIN 0-10/10 3/10 0/10   FUNCTIONAL SCALE LEFS (55) 31%    ROM KE -8 GA/QS 0 deg     KF 90 seated  deg STRENGHT (MMT) Quad tone fair Fair     KE NT 4/5*    KF NT 5/5    HF NT 4/5    HAB NT 4/5    HER NT 4+/5         RESTRICTIONS/PRECAUTIONS: DMII sugar not well controlled    Exercises/Interventions:     HEP:HEP instruction was provided and handouts given to include:  (see scanned forms)  Access Code: [email or web address]   Patient Portal: [email or web address]/        Therapeutic Ex (56318) Reps/Sets/time Notes/CUES HEP   3x30\"  X    3x30\"  X     10x10\"  X    2# X     10x5\"  X    2x10x3\" instructed to perform 10 reps X 11/4   1x10x3\" VC/MC X 11/4    10x3  X 11/4   piriformis stretch LLE (figure 4) 5x15\"  X 11/16   With black TB issued X 11/16   Pro-slant 1/2 foam roll R.L calf and hip stretch 3x30\" ea VC         10\"x10     HS stretch on mariana stretch R/L 30\"x3 ea VC for form            VC for form                 (2#) 2x10 Focus on TA/GS and ECC and slow speed        LP:  BLE with ABD RVL  BLE with ADD at knees  Up 2 down 1 L/R   (100, 120#) 15x2  (100#) 15x  (85#) 15x ea        (45#) 3x10  (45#) 3x10          Leg extension  30#, 2x10     Leg curls 40#, 2x10           Recumbent bike for ROM6' L3 Warm up        Pt education             Manual Intervention (17996)      Scar massage portals, patellar mobs, 3'                               NMR re-education (75888)  CUES NEEDED    FSU, LSU 10x2 ea     Captain hale's w shoulder ext RTB 2x10                                               Therapeutic Activity (84661)                                                Therapeutic Exercise and NMR EXR  [x] (83395) Provided verbal/tactile cueing for activities related to strengthening, flexibility, endurance, ROM for improvements in LE, proximal hip, and core control with self care, mobility, lifting, ambulation.  [] (66061) Provided verbal/tactile cueing for activities related to improving balance, coordination, kinesthetic sense, posture, motor skill, proprioception  to assist with LE, proximal hip, and core control in self care, mobility, lifting, ambulation and eccentric single leg control. NMR and Therapeutic Activities:    [] (58271 or 31478) Provided verbal/tactile cueing for activities related to improving balance, coordination, kinesthetic sense, posture, motor skill, proprioception and motor activation to allow for proper function of core, proximal hip and LE with self care and ADLs  [] (99429) Gait Re-education- Provided training and instruction to the patient for proper LE, core and proximal hip recruitment and positioning and eccentric body weight control with ambulation re-education including up and down stairs     Home Exercise Program:    [x] (37846) Reviewed/Progressed HEP activities related to strengthening, flexibility, endurance, ROM of core, proximal hip and LE for functional self-care, mobility, lifting and ambulation/stair navigation   [] (84575)Reviewed/Progressed HEP activities related to improving balance, coordination, kinesthetic sense, posture, motor skill, proprioception of core, proximal hip and LE for self care, mobility, lifting, and ambulation/stair navigation      Manual Treatments:  PROM / STM / Oscillations-Mobs:  G-I, II, III, IV (PA's, Inf., Post.)  [x] (89935) Provided manual therapy to mobilize LE, proximal hip and/or LS spine soft tissue/joints for the purpose of modulating pain, promoting relaxation,  increasing ROM, reducing/eliminating soft tissue swelling/inflammation/restriction, improving soft tissue extensibility and allowing for proper ROM for normal function with self care, mobility, lifting and ambulation. Trigger point Dry Needling:  fine needle insertion into myofascial trigger points to stimulate a healing response  [] (87437) Needle insertion without injection: 1 or 2 muscles  [] (14864) Needle insertion without injection: 3 or more muscles    Modalities:  CP x10'    [] GAME READY (VASO)- for significant edema, swelling, pain control.   x15' (pt requested)   [] ESU (HV/PM) for edema and pain control       Charges:  Timed Code Treatment Minutes: 40'   Total Treatment Minutes: 48'       [] EVAL (LOW) 00704 (typically 20 minutes face-to-face)  [] EVAL (MOD) 01787 (typically 30 minutes face-to-face)  [] EVAL (HIGH) 17873 (typically 45 minutes face-to-face)  [] RE-EVAL     [x] DC(71395) x  2   [] IONTO  [x] NMR (11780) x 1    [x] VASO  [] Manual (87973) x      [] Other:  [] TA x      [] Mech Traction (50567)  [] ES(attended) (37858)      [] ES (un) (02225):     GOALS:     Patient stated goal: to play golf  [x]? Progressing: []? Met: []? Not Met: []? Adjusted     Therapist goals for Patient:   Short Term Goals: To be achieved in: 2 weeks  1. Independent in HEP and progression per patient tolerance, in order to prevent re-injury. []? Progressing: []? Met: []? Not Met: []? Adjusted  2. Patient will have a decrease in pain to facilitate improvement in movement, function, and ADLs as indicated by Functional Deficits. []? Progressing: []? Met: []? Not Met: []? Adjusted     Long Term Goals: To be achieved in: 8 weeks  1. Disability index score of 15% or less for the LEFS to assist with reaching prior level of function. [x]? Progressing: []? Met: []? Not Met: []? Adjusted  2. Patient will demonstrate increased AROM to 0-125 to allow for proper joint functioning as indicated by patients Functional Deficits. []? Progressing: [x]? Met: []? Not Met: []? Adjusted  3. Patient will demonstrate an increase in Strength to good proximal hip strength and control, 4+/5 in LE to allow for proper functional mobility as indicated by patients Functional Deficits. []? Progressing: []? Met: []? Not Met: []? Adjusted  4. Patient will return to household functional activities without increased symptoms or restriction. [x]? Progressing: []? Met: []? Not Met: []? Adjusted  5. Pt will be able to play 9 holes of golf. (patient specific functional goal)    []? Progressing: []? Met: []? Not Met: []?  Adjusted ASSESSMENT:  Pt is progressing well with additional focus on strengthening. FSU and LSU were added with good tolerance and form. End of session patient had appropriate muscle fatigue following. Overall Progression Towards Functional goals/ Treatment Progress Update:  [x] Patient is progressing as expected towards functional goals listed. [] Progression is slowed due to complexities/Impairments listed. [] Progression has been slowed due to co-morbidities. [] Plan just implemented, too soon to assess goals progression <30days   [] Goals require adjustment due to lack of progress  [] Patient is not progressing as expected and requires additional follow up with physician  [] Other    Prognosis for POC: [x] Good [] Fair  [] Poor      Patient requires continued skilled intervention: [x] Yes  [] No    Treatment/Activity Tolerance:  [x] Patient able to complete treatment  [] Patient limited by fatigue  [] Patient limited by pain    [] Patient limited by other medical complications  [] Other:             PLAN: See eval. Cont L knee  functional quad strengthening and overall LE endurance and stability for 3 weeks then DC to HEP. [x] Continue per plan of care [] Alter current plan (see comments above)  [] Plan of care initiated [] Hold pending MD visit [] Discharge      Electronically signed by:  Elizabeth Grace, PT , DPT, OCS [id number]   Note: If patient does not return for scheduled/ recommended follow up visits, this note will serve as a discharge from care along with most recent update on progress.

## 2020-11-25 ENCOUNTER — HOSPITAL ENCOUNTER (OUTPATIENT)
Dept: PHYSICAL THERAPY | Age: 45
Setting detail: THERAPIES SERIES
Discharge: HOME | End: 2020-11-25
Payer: COMMERCIAL

## 2020-11-25 PROCEDURE — 97112 NEUROMUSCULAR REEDUCATION: CPT | Performed by: PHYSICAL THERAPIST

## 2020-11-25 PROCEDURE — 97110 THERAPEUTIC EXERCISES: CPT | Performed by: PHYSICAL THERAPIST

## 2020-11-25 PROCEDURE — 97140 MANUAL THERAPY 1/> REGIONS: CPT | Performed by: PHYSICAL THERAPIST

## 2020-11-25 NOTE — FLOWSHEET NOTE
[facility], [address] [place]  Phone: [phone]  Fax [phone]    Physical Therapy Treatment Note/ Progress Report:           Date:  2020    Patient Name:  Payam Jeffries    :  1975  MRN: [id number]    Medical/Treatment Diagnosis Information:  · Diagnosis: S83.232D (ICD-10-CM) - Complex tear of medial meniscus of left knee as current injury, subsequent encounter;M94.262 (ICD-10-CM) - Chondromalacia of knee, left;M23.42 (ICD-10-CM) - Bodies, loose, joint, knee, left  · Treatment Diagnosis: LEFT knee PMM/Chondroplasty/Plica Excision/Loose Body Removal 10/23/20; L knee pain (M25.562) L knee effusion (M25.462) L knee stiffness (M25.662) difficulty wlaking (I77.4)  Insurance/Certification information:  PT Insurance Information: [facility]-no EOB available at [address] [place] [address] Avenue:  Referring Practitioner: Favio Eldridge MD    Date of Patient follow up with Physician and OP note due: none scheduled     Latex Allergy/Pacemaker/Adhesive allergy:  [x]NO      []YES      Is this a Progress Report:     [x]  Yes  []  No      If Yes:  Date Range for reporting period:  Beginning:10/28/20  Endin20    Progress report will be due (10 Rx or 30 days ):    Due 20 Done         Recertification will be due (POC Duration  / 90 days ): 20     Has the plan of care been signed (Y/N):        [x]  Yes  []  No         Visit # Date Range Insurance Allowable Requires auth   9 10/23/20 [facility] 60 v    [x]no        []yes:           SUBJECTIVE:    Pt reports no issues since his last session. Has been doing scar massage. Pain level:  0/10 no medial pain    OBJECTIVE: See below   Observation: mint o no TTP portals, scar tissue laying down well.  Multiple TPs present in VL (painful with TPR)      PT Practice Pattern:D  Co morbidities:1-2  SURgical: 10/23/20 L PMM  INITIAL VISIT 10/28/20 CURRENT VISIT 20 PAIN 0-10/10 3/10 0/10   FUNCTIONAL SCALE LEFS (55) 31% (74/80) 8%   ROM KE -8 GA/QS 0 deg     KF 90 seated  deg                            STRENGHT (MMT) Quad tone fair Fair     KE NT 4/5*    KF NT 5/5    HF NT 4/5    HAB NT 4/5    HER NT 4+/5         RESTRICTIONS/PRECAUTIONS: DMII sugar not well controlled    Exercises/Interventions:     HEP:HEP instruction was provided and handouts given to include:  (see scanned forms)  Access Code: [email or web address]   Patient Portal: [employer]/        Therapeutic Ex (36139) Reps/Sets/time Notes/CUES HEP   3x30\"  X    3x30\"  X     10x10\"  X    2# X     10x5\"  X    2x10x3\" instructed to perform 10 reps X 11/4   1x10x3\" VC/MC X 11/4    10x3  X 11/4    X 11/16   Prone quad stretch 5x30\" With black TB issued X 11/16   Pro-slant 1/2 foam roll R.L calf and hip stretch 3x30\" ea VC         10\"x10     HS stretch on mariana stretch R/L  rotational outer hip stretch R/L mariana stretch  Ant HF stretch R/L mariana stretch 30\"x3 ea VC for form            VC for form                 (2#) 2x10 Focus on TA/GS and ECC and slow speed        LP:  BLE with ABD RVL  BLE with ADD at knees  Up 2 down 1 L/R  BHR   (120#) 2x10  (120#) 2x10  (100#) 2x15 ea  (100#) 2x15        (45#) 3x10  (45#) 3x10     Incline  BLE gastroc stretch 5x30\"                  Recumbent bike for ROM6' L3 Warm up        Pt education 5'  HEP, TPs, scar tissue formation          Manual Intervention (20608)      Scar massage portals, patellar mobs, TPR VL 12'                                 NMR re-education (49399)  CUES NEEDED    FSU, LSU          LBW  monster walk  High knee walk in place    RVL 2 laps 25ft  GVL 1 lap 25ft  GVL 20x     Tandem stance L post rows  GTB 20x3\" Heavy VC/MC    Modified tandem L post quick ext GTB 20x Poor balance, quick BUE EXT                            Therapeutic Activity (58381)                                                Therapeutic Exercise and NMR EXR  [x] (93812) Provided verbal/tactile cueing for activities related to strengthening, flexibility, endurance, ROM for improvements in LE, proximal hip, and core control with self care, mobility, lifting, ambulation.  [] (13726) Provided verbal/tactile cueing for activities related to improving balance, coordination, kinesthetic sense, posture, motor skill, proprioception  to assist with LE, proximal hip, and core control in self care, mobility, lifting, ambulation and eccentric single leg control.      NMR and Therapeutic Activities:    [x] (15836 or 52118) Provided verbal/tactile cueing for activities related to improving balance, coordination, kinesthetic sense, posture, motor skill, proprioception and motor activation to allow for proper function of core, proximal hip and LE with self care and ADLs  [] (89215) Gait Re-education- Provided training and instruction to the patient for proper LE, core and proximal hip recruitment and positioning and eccentric body weight control with ambulation re-education including up and down stairs     Home Exercise Program:    [x] (66972) Reviewed/Progressed HEP activities related to strengthening, flexibility, endurance, ROM of core, proximal hip and LE for functional self-care, mobility, lifting and ambulation/stair navigation   [] (75632)Reviewed/Progressed HEP activities related to improving balance, coordination, kinesthetic sense, posture, motor skill, proprioception of core, proximal hip and LE for self care, mobility, lifting, and ambulation/stair navigation      Manual Treatments:  PROM / STM / Oscillations-Mobs:  G-I, II, III, IV (PA's, Inf., Post.)  [x] (89452) Provided manual therapy to mobilize LE, proximal hip and/or LS spine soft tissue/joints for the purpose of modulating pain, promoting relaxation,  increasing ROM, reducing/eliminating soft tissue swelling/inflammation/restriction, improving soft tissue extensibility and allowing for proper ROM for normal function with self care, mobility, lifting and ambulation. Trigger point Dry Needling:  fine needle insertion into myofascial trigger points to stimulate a healing response  [] (96472) Needle insertion without injection: 1 or 2 muscles  [] (82325) Needle insertion without injection: 3 or more muscles    Modalities:  declined    [] GAME READY (VASO)- for significant edema, swelling, pain control. x15' (pt requested)   [] ESU (HV/PM) for edema and pain control       Charges:  Timed Code Treatment Minutes: 60'   Total Treatment Minutes: 61'       [] EVAL (LOW) 455 1011 (typically 20 minutes face-to-face)  [] EVAL (MOD) 11257 (typically 30 minutes face-to-face)  [] EVAL (HIGH) 01628 (typically 45 minutes face-to-face)  [] RE-EVAL     [x] FG(73462) x  2   [] IONTO  [x] NMR (59492) x 1    [] VASO  [x] Manual (80325) x 1     [] Other:  [] TA x      [] Mech Traction (00335)  [] ES(attended) (40682)      [] ES (un) (28297):     GOALS:     Patient stated goal: to play golf  [x]? Progressing: []? Met: []? Not Met: []? Adjusted     Therapist goals for Patient: (2/2 goals met)  Short Term Goals: To be achieved in: 2 weeks  1. Independent in HEP and progression per patient tolerance, in order to prevent re-injury. []? Progressing: [x]? Met: []? Not Met: []? Adjusted  2. Patient will have a decrease in pain to facilitate improvement in movement, function, and ADLs as indicated by Functional Deficits. []? Progressing: [x]? Met: []? Not Met: []? Adjusted     Long Term Goals: To be achieved in: 8 weeks (3/5 goals met)  1. Disability index score of 15% or less for the LEFS to assist with reaching prior level of function. []? Progressing: [x]? Met: []? Not Met: []? Adjusted  2. Patient will demonstrate increased AROM to 0-125 to allow for proper joint functioning as indicated by patients Functional Deficits. []? Progressing: [x]? Met: []? Not Met: []? Adjusted  3.  Patient will demonstrate an increase in Strength to good proximal hip strength and control, 4+/5 in LE to allow for proper functional mobility as indicated by patients Functional Deficits. [x]? Progressing: []? Met: []? Not Met: []? Adjusted  4. Patient will return to household functional activities without increased symptoms or restriction. []? Progressing: [x]? Met: []? Not Met: []? Adjusted  5. Pt will be able to play 9 holes of golf. (patient specific functional goal)  Not attempted  []? Progressing: []? Met: []? Not Met: []? Adjusted           ASSESSMENT:  Pt tolerated treatment and challenges well. He has poor balance with dynamic activities. He is progressing toward his goals. Golf goal may be inappropriate at this time due to weather issues not function. Overall Progression Towards Functional goals/ Treatment Progress Update:  [x] Patient is progressing as expected towards functional goals listed. [] Progression is slowed due to complexities/Impairments listed. [] Progression has been slowed due to co-morbidities. [] Plan just implemented, too soon to assess goals progression <30days   [] Goals require adjustment due to lack of progress  [] Patient is not progressing as expected and requires additional follow up with physician  [] Other    Prognosis for POC: [x] Good [] Fair  [] Poor      Patient requires continued skilled intervention: [x] Yes  [] No    Treatment/Activity Tolerance:  [x] Patient able to complete treatment  [] Patient limited by fatigue  [] Patient limited by pain    [] Patient limited by other medical complications  [] Other:             PLAN: See eval. Cont L knee  functional quad strengthening and overall LE endurance and stability next week then DC to HEP.     [x] Continue per plan of care [] Alter current plan (see comments above)  [] Plan of care initiated [] Hold pending MD visit [] Discharge      Electronically signed by:  Stewart Tamez PT , [id number]  Note: If patient does not return for scheduled/ recommended follow up visits, this note will serve as a discharge from care along with most recent update on progress.

## 2020-11-25 NOTE — PROGRESS NOTES
[facility], [address]  Phone: [phone]  Fax [phone]    Physical Therapy Treatment Note/ Progress Report:           Date:  2020    Patient Name:  Boogie Zapata    :  1975  MRN: [id number]    Medical/Treatment Diagnosis Information:  · Diagnosis: S83.232D (ICD-10-CM) - Complex tear of medial meniscus of left knee as current injury, subsequent encounter;M94.262 (ICD-10-CM) - Chondromalacia of knee, left;M23.42 (ICD-10-CM) - Bodies, loose, joint, knee, left  · Treatment Diagnosis: LEFT knee PMM/Chondroplasty/Plica Excision/Loose Body Removal 10/23/20; L knee pain (M25.562) L knee effusion (M25.462) L knee stiffness (M25.662) difficulty wlaking (A37.3)  Insurance/Certification information:  PT Insurance Information: [facility]-no EOB available at [address] [place] [address] Avenue:  Referring Practitioner: Sona Shrestha MD    Date of Patient follow up with Physician and OP note due: none scheduled     Latex Allergy/Pacemaker/Adhesive allergy:  [x]NO      []YES      Is this a Progress Report:     [x]  Yes  []  No      If Yes:  Date Range for reporting period:  Beginning:10/28/20  Endin20    Progress report will be due (10 Rx or 30 days ):    Due 20 Done 88/75/43        Recertification will be due (POC Duration  / 90 days ): 20     Has the plan of care been signed (Y/N):        [x]  Yes  []  No         Visit # Date Range Insurance Allowable Requires auth   9 10/23/20 [facility] 60 v    [x]no        []yes:           SUBJECTIVE:    Pt reports no issues since his last session. Has been doing scar massage. Pain level:  0/10 no medial pain    OBJECTIVE: See below   Observation: mint o no TTP portals, scar tissue laying down well.  Multiple TPs present in VL (painful with TPR)      PT Practice Pattern:D  Co morbidities:1-2  SURgical: 10/23/20 L PMM  INITIAL VISIT 10/28/20 CURRENT VISIT 20 PAIN 0-10/10 3/10 0/10   FUNCTIONAL SCALE LEFS (55) 31% (74/80) 8%   ROM KE -8 GA/QS 0 deg     KF 90 seated  deg                            STRENGHT (MMT) Quad tone fair Fair     KE NT 4/5*    KF NT 5/5    HF NT 4/5    HAB NT 4/5    HER NT 4+/5           GOALS:     Patient stated goal: to play golf  [x]? Progressing: []? Met: []? Not Met: []? Adjusted     Therapist goals for Patient: (2/2 goals met)  Short Term Goals: To be achieved in: 2 weeks  1. Independent in HEP and progression per patient tolerance, in order to prevent re-injury. []? Progressing: [x]? Met: []? Not Met: []? Adjusted  2. Patient will have a decrease in pain to facilitate improvement in movement, function, and ADLs as indicated by Functional Deficits. []? Progressing: [x]? Met: []? Not Met: []? Adjusted     Long Term Goals: To be achieved in: 8 weeks (3/5 goals met)  1. Disability index score of 15% or less for the LEFS to assist with reaching prior level of function. []? Progressing: [x]? Met: []? Not Met: []? Adjusted  2. Patient will demonstrate increased AROM to 0-125 to allow for proper joint functioning as indicated by patients Functional Deficits. []? Progressing: [x]? Met: []? Not Met: []? Adjusted  3. Patient will demonstrate an increase in Strength to good proximal hip strength and control, 4+/5 in LE to allow for proper functional mobility as indicated by patients Functional Deficits. [x]? Progressing: []? Met: []? Not Met: []? Adjusted  4. Patient will return to household functional activities without increased symptoms or restriction. []? Progressing: [x]? Met: []? Not Met: []? Adjusted  5. Pt will be able to play 9 holes of golf. (patient specific functional goal)  Not attempted  []? Progressing: []? Met: []? Not Met: []? Adjusted           ASSESSMENT:  Pt tolerated treatment and challenges well. He has poor balance with dynamic activities. He is progressing toward his goals.  Golf goal may be inappropriate at this time due to weather issues not function. Overall Progression Towards Functional goals/ Treatment Progress Update:  [x] Patient is progressing as expected towards functional goals listed. [] Progression is slowed due to complexities/Impairments listed. [] Progression has been slowed due to co-morbidities. [] Plan just implemented, too soon to assess goals progression <30days   [] Goals require adjustment due to lack of progress  [] Patient is not progressing as expected and requires additional follow up with physician  [] Other    Prognosis for POC: [x] Good [] Fair  [] Poor      Patient requires continued skilled intervention: [x] Yes  [] No    Treatment/Activity Tolerance:  [x] Patient able to complete treatment  [] Patient limited by fatigue  [] Patient limited by pain    [] Patient limited by other medical complications  [] Other:             PLAN: See eval. Cont L knee  functional quad strengthening and overall LE endurance and stability next week then DC to HEP. [x] Continue per plan of care [] Alter current plan (see comments above)  [] Plan of care initiated [] Hold pending MD visit [] Discharge      Electronically signed by:  Stewart Tamez, PT , [id number]  Note: If patient does not return for scheduled/ recommended follow up visits, this note will serve as a discharge from care along with most recent update on progress.

## 2020-11-30 ENCOUNTER — HOSPITAL ENCOUNTER (OUTPATIENT)
Dept: PHYSICAL THERAPY | Age: 45
Setting detail: THERAPIES SERIES
Discharge: HOME | End: 2020-11-30
Payer: COMMERCIAL

## 2020-11-30 PROCEDURE — 97110 THERAPEUTIC EXERCISES: CPT

## 2020-11-30 PROCEDURE — 97140 MANUAL THERAPY 1/> REGIONS: CPT

## 2020-11-30 PROCEDURE — 97112 NEUROMUSCULAR REEDUCATION: CPT

## 2020-11-30 NOTE — FLOWSHEET NOTE
morbidities:1-2  SURgical: 10/23/20 L PMM  INITIAL VISIT 10/28/20 CURRENT VISIT 11/25/20   PAIN 0-10/10 3/10 0/10   FUNCTIONAL SCALE LEFS (55) 31% (74/80) 8%   ROM KE -8 GA/QS 0 deg     KF 90 seated  deg                            STRENGHT (MMT) Quad tone fair Fair     KE NT 4/5*    KF NT 5/5    HF NT 4/5    HAB NT 4/5    HER NT 4+/5         RESTRICTIONS/PRECAUTIONS: DMII sugar not well controlled    Exercises/Interventions:     HEP:HEP instruction was provided and handouts given to include:  (see scanned forms)  Access Code: [email or web address]   Patient Portal: [employer]/        Therapeutic Ex (67784) Reps/Sets/time Notes/CUES HEP   3x30\"  X    3x30\"  X     10x10\"  X    2# X     10x5\"  X    2x10x3\" instructed to perform 10 reps X 11/4   1x10x3\" VC/MC X 11/4    10x3  X 11/4    X 11/16   5x30\" With black TB issued X 11/16   3x30\" ea VC         10\"x10     HS stretch on mariana stretch R/L  rotational outer hip stretch R/L mariana stretch  Ant HF stretch R/L mariana stretch 30\"x3 ea VC for form            VC for form                 (2#) 2x10 Focus on TA/GS and ECC and slow speed        LP:  BLE with ABD RVL  BLE with ADD at knees  Up 2 down 1 L/R  BHR   (120#) 2x10  (120#) 2x10  (100#) 2x15 ea  (100#) 2x15     HUSAM  HAB R/L  TKE    (75#) 2x15  (75#) 2x15, 3\"H      Incline  BLE gastroc stretch 5x30\"     Leg extension  30#, 3x10     Leg curls 40#, 3x10           Recumbent bike for ROM6' L3 Warm up        Pt education 5'  HEP, TPs, scar tissue formation          Manual Intervention (68088)      Acupressure release of L HF 10'     Massage stick L RF, VL, manual stretching of L quads, HF                            NMR re-education (52459)  CUES NEEDED              LBW  monster walk      OVL 15'x4  OVL 15'x4  GVL 20x     GTB 20x3\" Heavy VC/MC    GTB 20x Poor balance, quick BUE EXT    Hip slide outs on glider: ABD, ext 2x10 ea                       Therapeutic Activity (13748) Therapeutic Exercise and NMR EXR  [x] (98725) Provided verbal/tactile cueing for activities related to strengthening, flexibility, endurance, ROM for improvements in LE, proximal hip, and core control with self care, mobility, lifting, ambulation.  [] (73460) Provided verbal/tactile cueing for activities related to improving balance, coordination, kinesthetic sense, posture, motor skill, proprioception  to assist with LE, proximal hip, and core control in self care, mobility, lifting, ambulation and eccentric single leg control.      NMR and Therapeutic Activities:    [x] (73141 or 86024) Provided verbal/tactile cueing for activities related to improving balance, coordination, kinesthetic sense, posture, motor skill, proprioception and motor activation to allow for proper function of core, proximal hip and LE with self care and ADLs  [] (44923) Gait Re-education- Provided training and instruction to the patient for proper LE, core and proximal hip recruitment and positioning and eccentric body weight control with ambulation re-education including up and down stairs     Home Exercise Program:    [x] (44941) Reviewed/Progressed HEP activities related to strengthening, flexibility, endurance, ROM of core, proximal hip and LE for functional self-care, mobility, lifting and ambulation/stair navigation   [] (50915)Reviewed/Progressed HEP activities related to improving balance, coordination, kinesthetic sense, posture, motor skill, proprioception of core, proximal hip and LE for self care, mobility, lifting, and ambulation/stair navigation      Manual Treatments:  PROM / STM / Oscillations-Mobs:  G-I, II, III, IV (PA's, Inf., Post.)  [x] (85896) Provided manual therapy to mobilize LE, proximal hip and/or LS spine soft tissue/joints for the purpose of modulating pain, promoting relaxation,  increasing ROM, reducing/eliminating soft tissue swelling/inflammation/restriction, improving soft tissue extensibility and allowing for proper ROM for normal function with self care, mobility, lifting and ambulation. Trigger point Dry Needling:  fine needle insertion into myofascial trigger points to stimulate a healing response  [] (83242) Needle insertion without injection: 1 or 2 muscles  [] (27252) Needle insertion without injection: 3 or more muscles    Modalities:  CPx10'    [] GAME READY (VASO)- for significant edema, swelling, pain control. x15' (pt requested)   [] ESU (HV/PM) for edema and pain control       Charges:  Timed Code Treatment Minutes: 48'   Total Treatment Minutes: 61'       [] EVAL (LOW) 32434 (typically 20 minutes face-to-face)  [] EVAL (MOD) 27765 (typically 30 minutes face-to-face)  [] EVAL (HIGH) 55867 (typically 45 minutes face-to-face)  [] RE-EVAL     [x] DX(73147) x  2   [] IONTO  [x] NMR (77270) x 1    [] VASO  [x] Manual (57248) x 1     [] Other:  [] TA x      [] Mech Traction (43131)  [] ES(attended) (61702)      [] ES (un) (20497):     GOALS:     Patient stated goal: to play golf  [x]? Progressing: []? Met: []? Not Met: []? Adjusted     Therapist goals for Patient: (2/2 goals met)  Short Term Goals: To be achieved in: 2 weeks  1. Independent in HEP and progression per patient tolerance, in order to prevent re-injury. []? Progressing: [x]? Met: []? Not Met: []? Adjusted  2. Patient will have a decrease in pain to facilitate improvement in movement, function, and ADLs as indicated by Functional Deficits. []? Progressing: [x]? Met: []? Not Met: []? Adjusted     Long Term Goals: To be achieved in: 8 weeks (3/5 goals met)  1. Disability index score of 15% or less for the LEFS to assist with reaching prior level of function. []? Progressing: [x]? Met: []? Not Met: []? Adjusted  2. Patient will demonstrate increased AROM to 0-125 to allow for proper joint functioning as indicated by patients Functional Deficits. []? Progressing: [x]? Met: []? Not Met: []? Adjusted  3.  Patient will demonstrate an increase in Strength to good proximal hip strength and control, 4+/5 in LE to allow for proper functional mobility as indicated by patients Functional Deficits. [x]? Progressing: []? Met: []? Not Met: []? Adjusted  4. Patient will return to household functional activities without increased symptoms or restriction. []? Progressing: [x]? Met: []? Not Met: []? Adjusted  5. Pt will be able to play 9 holes of golf. (patient specific functional goal)  Not attempted  []? Progressing: []? Met: []? Not Met: []? Adjusted           ASSESSMENT:  Pt continues to progress well with progression of functional strengthening. He presented this date with L hip tightness through his L RF and VL that improved with MT and stretching. Patient will be appropriate for D/C NV. Overall Progression Towards Functional goals/ Treatment Progress Update:  [x] Patient is progressing as expected towards functional goals listed. [] Progression is slowed due to complexities/Impairments listed. [] Progression has been slowed due to co-morbidities. [] Plan just implemented, too soon to assess goals progression <30days   [] Goals require adjustment due to lack of progress  [] Patient is not progressing as expected and requires additional follow up with physician  [] Other    Prognosis for POC: [x] Good [] Fair  [] Poor      Patient requires continued skilled intervention: [x] Yes  [] No    Treatment/Activity Tolerance:  [x] Patient able to complete treatment  [] Patient limited by fatigue  [] Patient limited by pain    [] Patient limited by other medical complications  [] Other:             PLAN: NV DC to HEP.     [x] Continue per plan of care [] Alter current plan (see comments above)  [] Plan of care initiated [] Hold pending MD visit [] Discharge      Electronically signed by:  Sony Can, PT, DPT, OCS [id number]   Note: If patient does not return for scheduled/ recommended follow up visits, this note will serve as a discharge from care along with most recent update on progress.

## 2020-12-03 ENCOUNTER — HOSPITAL ENCOUNTER (OUTPATIENT)
Dept: PHYSICAL THERAPY | Age: 45
Setting detail: THERAPIES SERIES
Discharge: HOME | End: 2020-12-03
Payer: COMMERCIAL

## 2020-12-03 PROCEDURE — 97110 THERAPEUTIC EXERCISES: CPT | Performed by: PHYSICAL THERAPIST

## 2020-12-03 NOTE — FLOWSHEET NOTE
[facility], [address]  Phone: [phone]  Fax [phone]    Physical Therapy Treatment Note/ Progress Report:           Date:  12/3/2020    Patient Name:  Susana Leon    :  1975  MRN: [id number]    Medical/Treatment Diagnosis Information:  · Diagnosis: S83.232D (ICD-10-CM) - Complex tear of medial meniscus of left knee as current injury, subsequent encounter;M94.262 (ICD-10-CM) - Chondromalacia of knee, left;M23.42 (ICD-10-CM) - Bodies, loose, joint, knee, left  · Treatment Diagnosis: LEFT knee PMM/Chondroplasty/Plica Excision/Loose Body Removal 10/23/20; L knee pain (M25.562) L knee effusion (M25.462) L knee stiffness (M25.662) difficulty wlaking (G18.7)  Insurance/Certification information:  PT Insurance Information: [facility]-no EOB available at [address] [place] [address] Avenue:  Referring Practitioner: Edwige Tracey MD    Date of Patient follow up with Physician and OP note due: none scheduled     Latex Allergy/Pacemaker/Adhesive allergy:  [x]NO      []YES      Is this a Progress Report:     []  Yes  [x]  No DC NOTE   If Yes:  Date Range for reporting period:  Beginning:10/28/20  Endin20    Progress report will be due (10 Rx or 30 days ):    Due 20 Done /        Recertification will be due (POC Duration  / 90 days ): 20     Has the plan of care been signed (Y/N):        [x]  Yes  []  No         Visit # Date Range Insurance Allowable Requires auth   11 10/23/20-12/3/20 [facility] 60 v    [x]no        []yes:           SUBJECTIVE:     Pt reports no pain in his knee. He is confident with his HEP at this point.   Questions were answered today (see below)    Pain level:  0/10 no medial pain    OBJECTIVE: See below   Observation: No TTP      PT Practice Pattern:D  Co morbidities:1-2  SURgical: 10/23/20 L PMM  INITIAL VISIT 10/28/20 CURRENT VISIT 12/3/20   PAIN 0-10/10 3/10 0/10 FUNCTIONAL SCALE LEFS (55) 31% (74/80) 8%   ROM KE -8 GA/QS 0 deg     KF 90 seated  deg                            STRENGHT (MMT) Quad tone fair Fair     KE NT 5/5    KF NT 5/5    HF NT 5/5    HAB NT 5/5    HER NT 5/5         RESTRICTIONS/PRECAUTIONS: DMII sugar not well controlled    Exercises/Interventions:     HEP:HEP instruction was provided and handouts given to include:  (see scanned forms)  Access Code: [email or web address]   Patient Portal: [facility]/        Therapeutic Ex (03099) Reps/Sets/time Notes/CUES HEP   3x30\"  X    3x30\"  X     10x10\"  X    2# X     10x5\"  X    2x10x3\" instructed to perform 10 reps X 11/4   1x10x3\" VC/MC X 11/4    10x3  X 11/4    X 11/16   5x30\" With black TB issued X 11/16   3x30\" ea VC         10\"x10     HS stretch on mariana stretch R/L  rotational outer hip stretch R/L mariana stretch  Ant HF stretch R/L mariana stretch 30\"x3 ea VC for form            VC for form                 (2#) 2x10 Focus on TA/GS and ECC and slow speed        LP:  BLE with ABD RVL  BLE with ADD at knees  Up 2 down 1 L/R     (120#) 3x10  (120#) 3x10  (100#) 3x15 ea       HUSAM  HAB R/L  TKE    (75#) 2x15  (75#) 2x15, 3\"H      Incline  BLE gastroc stretch 5x30\"                  Recumbent bike for ROM6' L3 Warm up        Pt education 10'  See below    Reassessment for DC 5'     Manual Intervention (06443)                                    NMR re-education (98416)  CUES NEEDED                  Heavy VC/MC    Poor balance, quick BUE EXT                          Therapeutic Activity (11498)                                            Pt education: walk/jog program; cutting grass and icing, HEP after; golfing when he wants to; supplements     Therapeutic Exercise and NMR EXR  [x] (44944) Provided verbal/tactile cueing for activities related to strengthening, flexibility, endurance, ROM for improvements in LE, proximal hip, and core control with self care, mobility, lifting, ambulation.  [] (31481) Provided verbal/tactile cueing for activities related to improving balance, coordination, kinesthetic sense, posture, motor skill, proprioception  to assist with LE, proximal hip, and core control in self care, mobility, lifting, ambulation and eccentric single leg control. NMR and Therapeutic Activities:    [] (32501 or 96165) Provided verbal/tactile cueing for activities related to improving balance, coordination, kinesthetic sense, posture, motor skill, proprioception and motor activation to allow for proper function of core, proximal hip and LE with self care and ADLs  [] (17117) Gait Re-education- Provided training and instruction to the patient for proper LE, core and proximal hip recruitment and positioning and eccentric body weight control with ambulation re-education including up and down stairs     Home Exercise Program:    [x] (02464) Reviewed/Progressed HEP activities related to strengthening, flexibility, endurance, ROM of core, proximal hip and LE for functional self-care, mobility, lifting and ambulation/stair navigation   [] (19625)Reviewed/Progressed HEP activities related to improving balance, coordination, kinesthetic sense, posture, motor skill, proprioception of core, proximal hip and LE for self care, mobility, lifting, and ambulation/stair navigation      Manual Treatments:  PROM / STM / Oscillations-Mobs:  G-I, II, III, IV (PA's, Inf., Post.)  [] (17471) Provided manual therapy to mobilize LE, proximal hip and/or LS spine soft tissue/joints for the purpose of modulating pain, promoting relaxation,  increasing ROM, reducing/eliminating soft tissue swelling/inflammation/restriction, improving soft tissue extensibility and allowing for proper ROM for normal function with self care, mobility, lifting and ambulation.      Trigger point Dry Needling:  fine needle insertion into myofascial trigger points to stimulate a healing response  [] (69249) Needle insertion without injection: 1 or 2 muscles  [] (35083) Needle insertion without injection: 3 or more muscles    Modalities:  CPx10'    [] GAME READY (VASO)- for significant edema, swelling, pain control. x15'    [] ESU (HV/PM) for edema and pain control       Charges:  Timed Code Treatment Minutes: 40   Total Treatment Minutes: 50       [] EVAL (LOW) 00785 (typically 20 minutes face-to-face)  [] EVAL (MOD) 91322 (typically 30 minutes face-to-face)  [] EVAL (HIGH) 94372 (typically 45 minutes face-to-face)  [] RE-EVAL     [x] ZZ(78275) x  3   [] IONTO  [] NMR (11477)     [] VASO  [] Manual (45028)      [] Other:  [] TA x      [] Mech Traction (93973)  [] ES(attended) (30580)      [] ES (un) (06406):     GOALS:     Patient stated goal: to play golf  [x]? Progressing: []? Met: []? Not Met: []? Adjusted     Therapist goals for Patient: (2/2 goals met)  Short Term Goals: To be achieved in: 2 weeks  1. Independent in HEP and progression per patient tolerance, in order to prevent re-injury. []? Progressing: [x]? Met: []? Not Met: []? Adjusted  2. Patient will have a decrease in pain to facilitate improvement in movement, function, and ADLs as indicated by Functional Deficits. []? Progressing: [x]? Met: []? Not Met: []? Adjusted     Long Term Goals: To be achieved in: 8 weeks (5/5 goals met)  1. Disability index score of 15% or less for the LEFS to assist with reaching prior level of function. []? Progressing: [x]? Met: []? Not Met: []? Adjusted  2. Patient will demonstrate increased AROM to 0-125 to allow for proper joint functioning as indicated by patients Functional Deficits. []? Progressing: [x]? Met: []? Not Met: []? Adjusted  3. Patient will demonstrate an increase in Strength to good proximal hip strength and control, 4+/5 in LE to allow for proper functional mobility as indicated by patients Functional Deficits. [x]? Progressing: []? Met: []? Not Met: []? Adjusted  4.  Patient will return to household functional activities without increased symptoms or

## 2020-12-03 NOTE — DISCHARGE SUMMARY
[facility], [address]  Phone: [phone]  Fax [phone]    Physical Therapy Discharge:           Date:  12/3/2020    Patient Name:  Sam Nielson    :  1975  MRN: [id number]    Medical/Treatment Diagnosis Information:  · Diagnosis: S83.232D (ICD-10-CM) - Complex tear of medial meniscus of left knee as current injury, subsequent encounter;M94.262 (ICD-10-CM) - Chondromalacia of knee, left;M23.42 (ICD-10-CM) - Bodies, loose, joint, knee, left  · Treatment Diagnosis: LEFT knee PMM/Chondroplasty/Plica Excision/Loose Body Removal 10/23/20; L knee pain (M25.562) L knee effusion (M25.462) L knee stiffness (M25.662) difficulty wlaking (H08.1)  Insurance/Certification information:  PT Insurance Information: [facility]-no EOB available at [address] [place] [address] Avenue:  Referring Practitioner: Chavez Moreno MD    Date of Patient follow up with Physician and OP note due: none scheduled     Latex Allergy/Pacemaker/Adhesive allergy:  [x]NO      []YES      Is this a Progress Report:     []  Yes  [x]  No DC NOTE   If Yes:  Date Range for reporting period:  Beginning:10/28/20  Endin20    Progress report will be due (10 Rx or 30 days ):    Due 20 Done         Recertification will be due (POC Duration  / 90 days ): 20     Has the plan of care been signed (Y/N):        [x]  Yes  []  No         Visit # Date Range Insurance Allowable Requires auth   11 10/23/20-12/3/20 [facility] 60 v    [x]no        []yes:           SUBJECTIVE:     Pt reports no pain in his knee. He is confident with his HEP at this point.   Questions were answered today (see below)    Pain level:  0/10 no medial pain    OBJECTIVE: See below   Observation: No TTP      PT Practice Pattern:D  Co morbidities:1-2  SURgical: 10/23/20 L PMM  INITIAL VISIT 10/28/20 CURRENT VISIT 12/3/20   PAIN 0-10/10 3/10 0/10   FUNCTIONAL SCALE LEFS (55) 31% ASSESSMENT:   All goals met. Pt ready for DC to HEP. Overall Progression Towards Functional goals/ Treatment Progress Update:  [x] Patient is progressing as expected towards functional goals listed. [] Progression is slowed due to complexities/Impairments listed. [] Progression has been slowed due to co-morbidities. [] Plan just implemented, too soon to assess goals progression <30days   [] Goals require adjustment due to lack of progress  [] Patient is not progressing as expected and requires additional follow up with physician  [x] Other: goals met    Prognosis for POC: [x] Good [] Fair  [] Poor      Patient requires continued skilled intervention: [x] Yes  [x] No    Treatment/Activity Tolerance:  [x] Patient able to complete treatment  [] Patient limited by fatigue  [] Patient limited by pain    [] Patient limited by other medical complications  [] Other:             PLAN: NV DC to HEP. [] Continue per plan of care [] Alter current plan (see comments above)  [] Plan of care initiated [] Hold pending MD visit [x] Discharge      Electronically signed by:  Davian Ruelas, PT, [id number]  Note: If patient does not return for scheduled/ recommended follow up visits, this note will serve as a discharge from care along with most recent update on progress.

## 2021-01-04 DIAGNOSIS — E11.9 TYPE 2 DIABETES MELLITUS WITHOUT COMPLICATION, WITHOUT LONG-TERM CURRENT USE OF INSULIN (HCC): ICD-10-CM

## 2021-01-05 RX ORDER — METFORMIN HYDROCHLORIDE 500 MG/1
TABLET, EXTENDED RELEASE ORAL
Qty: 360 TABLET | Refills: 0 | Status: SHIPPED | OUTPATIENT
Start: 2021-01-05 | End: 2021-01-29 | Stop reason: SDUPTHER

## 2021-01-11 DIAGNOSIS — E78.2 MIXED HYPERLIPIDEMIA: ICD-10-CM

## 2021-01-13 ENCOUNTER — TELEPHONE (OUTPATIENT)
Dept: FAMILY MEDICINE CLINIC | Age: 46
End: 2021-01-13

## 2021-01-13 NOTE — TELEPHONE ENCOUNTER
----- Message from Shayy Miller sent at 1/13/2021  9:16 AM EST -----  Subject: Appointment Request    Reason for Call: Routine Physical Exam    QUESTIONS  Type of Appointment? Established Patient  Reason for appointment request? No appointments available during search  Additional Information for Provider? Pt would like to get in for a   physical exam with Dr Divine Craig. He would like to get in toward the end of   January because he also needs to see Dr Divine Craig for his Lipitor medication   refills. Pt would like early morning or late afternoon  ---------------------------------------------------------------------------  --------------  CALL BACK INFO  What is the best way for the office to contact you? OK to leave message on   voicemail  Preferred Call Back Phone Number? [phone]  ---------------------------------------------------------------------------  --------------  SCRIPT ANSWERS  Relationship to Patient? Self  Appointment reason? Well Care/Follow Ups  Select a Well Care/Follow Ups appointment reason? Adult Physical Exam   [Medicare Annual Wellness   AWV   PAP   Pelvic]  (If the patient is Medicare / 65+ ask this question) Are you requesting a   Medicare Annual Wellness Visit? No  (If the patient is female   ask this question) Are you requesting a pap smear with your physical   exam? No  (Is the patient requesting their annual physical and does not need PAP or   AWV per above)? Yes   Have you been diagnosed with   tested for   or told that you are suspected of having COVID-19 (Coronavirus)? Yes  Did your symptoms begin or have you been tested for COVID-19 in the last   10 days? No  Have you had a fever or taken medication to treat a fever within the past   3 days? No  Have you had a cough   shortness of breath or flu-like symptoms within the past 3 days? No  Do you currently have flu-like symptoms including fever or chills   cough   shortness of breath   or difficulty breathing   or new loss of taste or smell? No  (Service Expert  click yes below to proceed with [employer] As Usual   Scheduling)?  Yes

## 2021-01-14 RX ORDER — ATORVASTATIN CALCIUM 40 MG/1
40 TABLET, FILM COATED ORAL DAILY
Qty: 90 TABLET | Refills: 1 | Status: SHIPPED | OUTPATIENT
Start: 2021-01-14 | End: 2021-01-29 | Stop reason: SDUPTHER

## 2021-01-29 ENCOUNTER — OFFICE VISIT (OUTPATIENT)
Dept: FAMILY MEDICINE CLINIC | Age: 46
End: 2021-01-29
Payer: COMMERCIAL

## 2021-01-29 VITALS
WEIGHT: 251 LBS | SYSTOLIC BLOOD PRESSURE: 126 MMHG | BODY MASS INDEX: 33.12 KG/M2 | OXYGEN SATURATION: 99 % | DIASTOLIC BLOOD PRESSURE: 70 MMHG | HEART RATE: 77 BPM | TEMPERATURE: 97 F

## 2021-01-29 DIAGNOSIS — Z11.59 NEED FOR HEPATITIS B SCREENING TEST: ICD-10-CM

## 2021-01-29 DIAGNOSIS — Z11.59 NEED FOR HEPATITIS C SCREENING TEST: ICD-10-CM

## 2021-01-29 DIAGNOSIS — E78.2 MIXED HYPERLIPIDEMIA: ICD-10-CM

## 2021-01-29 DIAGNOSIS — E11.9 TYPE 2 DIABETES MELLITUS WITHOUT COMPLICATION, WITHOUT LONG-TERM CURRENT USE OF INSULIN (HCC): ICD-10-CM

## 2021-01-29 DIAGNOSIS — Z00.00 PREVENTATIVE HEALTH CARE: Primary | ICD-10-CM

## 2021-01-29 LAB
A/G RATIO: 2 (ref 1.1–2.2)
ALBUMIN SERPL-MCNC: 4.6 G/DL (ref 3.4–5)
ALP BLD-CCNC: 103 U/L (ref 40–129)
ALT SERPL-CCNC: 58 U/L (ref 10–40)
ANION GAP SERPL CALCULATED.3IONS-SCNC: 11 MMOL/L (ref 3–16)
AST SERPL-CCNC: 41 U/L (ref 15–37)
BASOPHILS ABSOLUTE: 0 K/UL (ref 0–0.2)
BASOPHILS RELATIVE PERCENT: 0.7 %
BILIRUB SERPL-MCNC: 0.5 MG/DL (ref 0–1)
BUN BLDV-MCNC: 16 MG/DL (ref 7–20)
CALCIUM SERPL-MCNC: 9.5 MG/DL (ref 8.3–10.6)
CHLORIDE BLD-SCNC: 105 MMOL/L (ref 99–110)
CHOLESTEROL, TOTAL: 129 MG/DL (ref 0–199)
CO2: 22 MMOL/L (ref 21–32)
CREAT SERPL-MCNC: 0.9 MG/DL (ref 0.9–1.3)
CREATININE URINE POCT: 200
EOSINOPHILS ABSOLUTE: 0.2 K/UL (ref 0–0.6)
EOSINOPHILS RELATIVE PERCENT: 2.7 %
GFR AFRICAN AMERICAN: >60
GFR NON-AFRICAN AMERICAN: >60
GLOBULIN: 2.3 G/DL
GLUCOSE BLD-MCNC: 152 MG/DL (ref 70–99)
HBA1C MFR BLD: 7.7 %
HBV SURFACE AB TITR SER: <3.5 MIU/ML
HCT VFR BLD CALC: 45.3 % (ref 40.5–52.5)
HDLC SERPL-MCNC: 41 MG/DL (ref 40–60)
HEMOGLOBIN: 15.1 G/DL (ref 13.5–17.5)
HEPATITIS C ANTIBODY INTERPRETATION: NORMAL
LDL CHOLESTEROL CALCULATED: 68 MG/DL
LYMPHOCYTES ABSOLUTE: 1.9 K/UL (ref 1–5.1)
LYMPHOCYTES RELATIVE PERCENT: 28.1 %
MCH RBC QN AUTO: 31.7 PG (ref 26–34)
MCHC RBC AUTO-ENTMCNC: 33.4 G/DL (ref 31–36)
MCV RBC AUTO: 95 FL (ref 80–100)
MICROALBUMIN/CREAT 24H UR: 10 MG/G{CREAT}
MICROALBUMIN/CREAT UR-RTO: <30
MONOCYTES ABSOLUTE: 0.4 K/UL (ref 0–1.3)
MONOCYTES RELATIVE PERCENT: 6.6 %
NEUTROPHILS ABSOLUTE: 4.1 K/UL (ref 1.7–7.7)
NEUTROPHILS RELATIVE PERCENT: 61.9 %
PDW BLD-RTO: 13 % (ref 12.4–15.4)
PLATELET # BLD: 251 K/UL (ref 135–450)
PMV BLD AUTO: 8.7 FL (ref 5–10.5)
POTASSIUM SERPL-SCNC: 4.4 MMOL/L (ref 3.5–5.1)
RBC # BLD: 4.76 M/UL (ref 4.2–5.9)
SODIUM BLD-SCNC: 138 MMOL/L (ref 136–145)
TOTAL PROTEIN: 6.9 G/DL (ref 6.4–8.2)
TRIGL SERPL-MCNC: 102 MG/DL (ref 0–150)
VLDLC SERPL CALC-MCNC: 20 MG/DL
WBC # BLD: 6.7 K/UL (ref 4–11)

## 2021-01-29 PROCEDURE — G8482 FLU IMMUNIZE ORDER/ADMIN: HCPCS | Performed by: FAMILY MEDICINE

## 2021-01-29 PROCEDURE — 99396 PREV VISIT EST AGE 40-64: CPT | Performed by: FAMILY MEDICINE

## 2021-01-29 PROCEDURE — 82044 UR ALBUMIN SEMIQUANTITATIVE: CPT | Performed by: FAMILY MEDICINE

## 2021-01-29 PROCEDURE — 83036 HEMOGLOBIN GLYCOSYLATED A1C: CPT | Performed by: FAMILY MEDICINE

## 2021-01-29 RX ORDER — ATORVASTATIN CALCIUM 40 MG/1
40 TABLET, FILM COATED ORAL DAILY
Qty: 90 TABLET | Refills: 1 | Status: SHIPPED | OUTPATIENT
Start: 2021-01-29 | End: 2022-01-25

## 2021-01-29 RX ORDER — METFORMIN HYDROCHLORIDE 500 MG/1
TABLET, EXTENDED RELEASE ORAL
Qty: 360 TABLET | Refills: 1 | Status: SHIPPED | OUTPATIENT
Start: 2021-01-29 | End: 2022-03-22

## 2021-01-29 NOTE — PROGRESS NOTES
Kelli Hinkle is a 50 y.o. male    Chief Complaint   Patient presents with    Annual Exam       HPI:    Preventative care. Pneumococcal: UTD  Flu shot: UTD    Diabetes  He presents for his follow-up diabetic visit. He has type 2 diabetes mellitus. His disease course has been stable. Pertinent negatives for diabetes include no polydipsia. Risk factors for coronary artery disease include diabetes mellitus. He is following a diabetic diet. An ACE inhibitor/angiotensin II receptor blocker is not being taken. Hyperlipidemia  This is a chronic problem. The current episode started more than 1 year ago. The problem is controlled. Recent lipid tests were reviewed and are normal. Exacerbating diseases include diabetes. Pertinent negatives include no myalgias. Current antihyperlipidemic treatment includes statins. The current treatment provides significant improvement of lipids. There are no compliance problems. Risk factors for coronary artery disease include diabetes mellitus. ROS:    Review of Systems   Endocrine: Negative for polydipsia. Musculoskeletal: Negative for myalgias. /70 (Site: Right Upper Arm, Position: Sitting, Cuff Size: Large Adult)   Pulse 77   Temp 97 °F (36.1 °C) (Temporal)   Wt 251 lb (113.9 kg)   SpO2 99%   BMI 33.12 kg/m²     Physical Exam:    Physical Exam  Constitutional:       General: He is not in acute distress. Appearance: Normal appearance. He is well-developed. He is not ill-appearing, toxic-appearing or diaphoretic. HENT:      Head: Normocephalic. Neck:      Musculoskeletal: Normal range of motion. No neck rigidity. Cardiovascular:      Rate and Rhythm: Normal rate and regular rhythm. Pulses: Normal pulses. Heart sounds: No murmur. Pulmonary:      Effort: Pulmonary effort is normal. No respiratory distress. Breath sounds: Normal breath sounds. No wheezing. Lymphadenopathy:      Cervical: No cervical adenopathy.    Neurological: Mental Status: He is alert. Psychiatric:         Mood and Affect: Mood normal.         Behavior: Behavior normal.         Thought Content: Thought content normal.     cyst on scalp, no erythema or drainage    Current Outpatient Medications   Medication Sig Dispense Refill    Misc Natural Products (OSTEO BI-FLEX ADV DOUBLE ST) TABS Take 500 tablets by mouth 2 times daily      atorvastatin (LIPITOR) 40 MG tablet Take 1 tablet by mouth daily 90 tablet 1    metFORMIN (GLUCOPHAGE-XR) 500 MG extended release tablet TAKE 2 TABLETS BY MOUTH TWICE A DAY WITH MEALS 360 tablet 1    brimonidine (ALPHAGAN) 0.2 % ophthalmic solution 1 drop 3 times daily      dorzolamide-timolol (COSOPT) 22.3-6.8 MG/ML ophthalmic solution 1 drop 2 times daily      blood glucose monitor strips Dispense what insurance will cover. 100 strip 1    LUMIGAN 0.01 % SOLN ophthalmic drops   5    Lancets MISC Dispense lancets insurance will cover. (Patient not taking: Reported on 1/29/2021) 100 each 1    Misc. Devices MISC 1 each by Does not apply route once for 1 dose Dispense what patients insurance will cover. 1 Device 0     No current facility-administered medications for this visit. Assessment:    1. Preventative health care    2. Type 2 diabetes mellitus without complication, without long-term current use of insulin ([address]    3. Mixed hyperlipidemia    4. Need for hepatitis B screening test    5. Need for hepatitis C screening test        Plan:    1. Preventative health care  Continue to monitor scalp lesion. 2. Type 2 diabetes mellitus without complication, without long-term current use of insulin (HCC)  Stable. Continue current medications. - POCT glycosylated hemoglobin (Hb A1C) 7.7  - metFORMIN, MOD, (GLUMETZA) 500 MG extended release tablet; Take 2 tablets by mouth 2 times daily (with meals)  Dispense: 360 tablet; Refill: 1    3. Mixed hyperlipidemia  Stable. Continue current medications. - atorvastatin (LIPITOR) 40 MG tablet; Take 1 tablet by mouth daily  Dispense: 90 tablet; Refill: 1        Return in about 6 months (around 7/29/2021) for Diabetes, Hyperlipidemia.

## 2021-11-22 ENCOUNTER — TELEPHONE (OUTPATIENT)
Dept: FAMILY MEDICINE CLINIC | Age: 46
End: 2021-11-22

## 2021-12-04 ENCOUNTER — OFFICE VISIT (OUTPATIENT)
Dept: ORTHOPEDIC SURGERY | Age: 46
End: 2021-12-04
Payer: COMMERCIAL

## 2021-12-04 DIAGNOSIS — M70.21 OLECRANON BURSITIS OF RIGHT ELBOW: ICD-10-CM

## 2021-12-04 DIAGNOSIS — M25.521 RIGHT ELBOW PAIN: Primary | ICD-10-CM

## 2021-12-04 PROCEDURE — G8428 CUR MEDS NOT DOCUMENT: HCPCS | Performed by: PHYSICIAN ASSISTANT

## 2021-12-04 PROCEDURE — G8417 CALC BMI ABV UP PARAM F/U: HCPCS | Performed by: PHYSICIAN ASSISTANT

## 2021-12-04 PROCEDURE — G8484 FLU IMMUNIZE NO ADMIN: HCPCS | Performed by: PHYSICIAN ASSISTANT

## 2021-12-04 PROCEDURE — 20610 DRAIN/INJ JOINT/BURSA W/O US: CPT | Performed by: PHYSICIAN ASSISTANT

## 2021-12-04 PROCEDURE — 99213 OFFICE O/P EST LOW 20 MIN: CPT | Performed by: PHYSICIAN ASSISTANT

## 2021-12-04 PROCEDURE — 1036F TOBACCO NON-USER: CPT | Performed by: PHYSICIAN ASSISTANT

## 2021-12-04 RX ORDER — TRIAMCINOLONE ACETONIDE 40 MG/ML
40 INJECTION, SUSPENSION INTRA-ARTICULAR; INTRAMUSCULAR ONCE
Qty: 1 ML | Refills: 0 | Status: SHIPPED | OUTPATIENT
Start: 2021-12-04 | End: 2021-12-04 | Stop reason: CLARIF

## 2021-12-04 NOTE — PROGRESS NOTES
This dictation was done with Dragon dictation and may contain mechanical errors related to translation. I have today reviewed with Jeremiah Matthews the clinically relevant, past medical history, medications, allergies, family history, social history, and Review Of Systems form the patients most recent history form & I have documented any details relevant to today's presenting complaints in my history below. Mr. Eduardo Margarita Hoene's self-reported past medical history, medications, allergies, family history, social history, and Review Of Systems form has been scanned into the chart under the \"Media\" tab. Subjective: Jeremiah Matthews is a 48 y.o. who is here as an established patient with new problem C after-hours [facility]. He is seen Dr. Reed before for knee meniscus injury. This is a new injury today he does not remember exactly how it happened he was helping somebody move but over the last 10 to 14 days he started to get a lot of swelling in the posterior aspect of his elbow over the olecranon area. It does not hurt but with the swelling he comes in here for evaluation and treatment. I initially sent in for x-rays including an AP and lateral of the elbow.       Patient Active Problem List   Diagnosis    Mixed hyperlipidemia    Hyperglycemia    Type 2 diabetes mellitus without complication (HCC)    Tendinitis of left rotator cuff    Adhesive capsulitis of left shoulder           Current Outpatient Medications on File Prior to Visit   Medication Sig Dispense Refill    Misc Natural Products (OSTEO BI-FLEX ADV DOUBLE ST) TABS Take 500 tablets by mouth 2 times daily      atorvastatin (LIPITOR) 40 MG tablet Take 1 tablet by mouth daily 90 tablet 1    metFORMIN (GLUCOPHAGE-XR) 500 MG extended release tablet TAKE 2 TABLETS BY MOUTH TWICE A DAY WITH MEALS 360 tablet 1    brimonidine (ALPHAGAN) 0.2 % ophthalmic solution 1 drop 3 times daily      dorzolamide-timolol (COSOPT) 22.3-6.8 MG/ML ophthalmic solution 1 drop 2 times daily      blood glucose monitor strips Dispense what insurance will cover. 100 strip 1    Lancets MISC Dispense lancets insurance will cover. (Patient not taking: Reported on 1/29/2021) 100 each 1    Misc. Devices MISC 1 each by Does not apply route once for 1 dose Dispense what patients insurance will cover. 1 Device 0    LUMIGAN 0.01 % SOLN ophthalmic drops   5     No current facility-administered medications on file prior to visit. Objective: There were no vitals taken for this visit. On examination this pleasant 25-year-old gentleman in no acute distress he is alert and oriented x3 he has got full symmetric motion of both elbows with good  strength good wrist extension strength and finger opposition strength testing. His 0 to 155 degrees of motion. He has an obvious olecranon bursitis that the skin looks not erythematous and no signs of infection. Neuro exam grossly intact both lower extremities. Intact sensation to light touch. Motor exam 4+ to 5/5 in all major motor groups. Negative Reveles's sign. Skin is warm, dry and intact with out erythema or significant increased temperature around the knee joint(s). There are no cutaneous lesions or lymphadenopathy present. X-RAYS:  The x-rays taken at the office today do not show an olecranon spur and is negative for sail sign. There is no fractures or other bone abnormalities excess essentially a normal elbow x-ray with obvious soft tissue swelling for the olecranon bursitis. Assessment:  Right elbow olecranon bursitis noninfected    Plan:  During today's visit, there was approximately 30 minutes of face-to-face discussion in regards to the patient's current condition and treatment options. More than 50 % of the time was counseling and coordination of care as indicated above.   We talked about short and long-term expectations and with his consent I was able to aspirate 20 cc of clear aspirate serous fluid from his cyst and then I injected 1 cc of Marcaine and half cc of Kenalog into the bursal sac. A pressure wrap was applied and we talked about postinjection care and aspiration care over the next 4 to 7 days. He understands that a third of the time this does not come back then the rest of the time it comes back at some point. If it comes back he will follow-up with [facility] Frye or Dr. Philippe Boswell NOTE:   Olecranon bursal aspiration      They will schedule a follow up in.

## 2022-02-07 LAB — DIABETIC RETINOPATHY: NEGATIVE

## 2022-03-19 DIAGNOSIS — E11.9 TYPE 2 DIABETES MELLITUS WITHOUT COMPLICATION, WITHOUT LONG-TERM CURRENT USE OF INSULIN (HCC): ICD-10-CM

## 2022-03-21 NOTE — TELEPHONE ENCOUNTER
Refill Request     Last Seen: Last Seen Department: 1/29/2021  Last Seen by PCP: 1/29/2021    Last Written: 1/29/2021 for #360 tablet with #1 refill    Next Appointment:   Future Appointments   Date Time Provider Dianna Lopez   5/9/2022  9:00 AM DO HYACINTH [facility] [place] - [facility]       Future appointment scheduled      Requested Prescriptions     Pending Prescriptions Disp Refills    metFORMIN (GLUCOPHAGE-XR) 500 MG extended release tablet [Pharmacy Med Name: METFORMIN HCL  MG TABLET] 360 tablet 1     Sig: TAKE 2 TABLETS BY MOUTH TWICE A DAY WITH MEALS

## 2022-03-22 RX ORDER — METFORMIN HYDROCHLORIDE 500 MG/1
TABLET, EXTENDED RELEASE ORAL
Qty: 360 TABLET | Refills: 1 | Status: SHIPPED | OUTPATIENT
Start: 2022-03-22

## 2022-04-01 LAB — DIABETIC RETINOPATHY: NEGATIVE

## 2022-05-09 ENCOUNTER — OFFICE VISIT (OUTPATIENT)
Dept: FAMILY MEDICINE CLINIC | Age: 47
End: 2022-05-09
Payer: COMMERCIAL

## 2022-05-09 VITALS
DIASTOLIC BLOOD PRESSURE: 74 MMHG | HEART RATE: 73 BPM | OXYGEN SATURATION: 97 % | BODY MASS INDEX: 32.98 KG/M2 | WEIGHT: 250 LBS | SYSTOLIC BLOOD PRESSURE: 126 MMHG

## 2022-05-09 DIAGNOSIS — E55.9 VITAMIN D DEFICIENCY: ICD-10-CM

## 2022-05-09 DIAGNOSIS — Z12.5 SCREENING PSA (PROSTATE SPECIFIC ANTIGEN): ICD-10-CM

## 2022-05-09 DIAGNOSIS — Z00.00 PREVENTATIVE HEALTH CARE: Primary | ICD-10-CM

## 2022-05-09 DIAGNOSIS — E78.2 MIXED HYPERLIPIDEMIA: ICD-10-CM

## 2022-05-09 DIAGNOSIS — E11.9 TYPE 2 DIABETES MELLITUS WITHOUT COMPLICATION, WITHOUT LONG-TERM CURRENT USE OF INSULIN (HCC): ICD-10-CM

## 2022-05-09 DIAGNOSIS — Z23 NEED FOR TDAP VACCINATION: ICD-10-CM

## 2022-05-09 DIAGNOSIS — Z12.11 SCREENING FOR COLON CANCER: ICD-10-CM

## 2022-05-09 LAB
A/G RATIO: 2 (ref 1.1–2.2)
ALBUMIN SERPL-MCNC: 4.6 G/DL (ref 3.4–5)
ALP BLD-CCNC: 112 U/L (ref 40–129)
ALT SERPL-CCNC: 40 U/L (ref 10–40)
ANION GAP SERPL CALCULATED.3IONS-SCNC: 14 MMOL/L (ref 3–16)
AST SERPL-CCNC: 28 U/L (ref 15–37)
BASOPHILS ABSOLUTE: 0 K/UL (ref 0–0.2)
BASOPHILS RELATIVE PERCENT: 0.6 %
BILIRUB SERPL-MCNC: 0.6 MG/DL (ref 0–1)
BUN BLDV-MCNC: 11 MG/DL (ref 7–20)
CALCIUM SERPL-MCNC: 9.4 MG/DL (ref 8.3–10.6)
CHLORIDE BLD-SCNC: 101 MMOL/L (ref 99–110)
CHOLESTEROL, TOTAL: 176 MG/DL (ref 0–199)
CO2: 23 MMOL/L (ref 21–32)
CREAT SERPL-MCNC: 0.9 MG/DL (ref 0.9–1.3)
CREATININE URINE POCT: 200
EOSINOPHILS ABSOLUTE: 0.2 K/UL (ref 0–0.6)
EOSINOPHILS RELATIVE PERCENT: 2.5 %
FOLATE: 13.37 NG/ML (ref 4.78–24.2)
GFR AFRICAN AMERICAN: >60
GFR NON-AFRICAN AMERICAN: >60
GLUCOSE BLD-MCNC: 206 MG/DL (ref 70–99)
HBA1C MFR BLD: 9.5 %
HCT VFR BLD CALC: 46.6 % (ref 40.5–52.5)
HDLC SERPL-MCNC: 47 MG/DL (ref 40–60)
HEMOGLOBIN: 15.5 G/DL (ref 13.5–17.5)
LDL CHOLESTEROL CALCULATED: 89 MG/DL
LYMPHOCYTES ABSOLUTE: 1.8 K/UL (ref 1–5.1)
LYMPHOCYTES RELATIVE PERCENT: 27.3 %
MCH RBC QN AUTO: 31.4 PG (ref 26–34)
MCHC RBC AUTO-ENTMCNC: 33.3 G/DL (ref 31–36)
MCV RBC AUTO: 94.3 FL (ref 80–100)
MICROALBUMIN/CREAT 24H UR: 10 MG/G{CREAT}
MICROALBUMIN/CREAT UR-RTO: <30
MONOCYTES ABSOLUTE: 0.6 K/UL (ref 0–1.3)
MONOCYTES RELATIVE PERCENT: 8.5 %
NEUTROPHILS ABSOLUTE: 4 K/UL (ref 1.7–7.7)
NEUTROPHILS RELATIVE PERCENT: 61.1 %
PDW BLD-RTO: 13.4 % (ref 12.4–15.4)
PLATELET # BLD: 238 K/UL (ref 135–450)
PMV BLD AUTO: 8.2 FL (ref 5–10.5)
POTASSIUM SERPL-SCNC: 4.4 MMOL/L (ref 3.5–5.1)
PROSTATE SPECIFIC ANTIGEN: 0.71 NG/ML (ref 0–4)
RBC # BLD: 4.94 M/UL (ref 4.2–5.9)
SODIUM BLD-SCNC: 138 MMOL/L (ref 136–145)
TOTAL PROTEIN: 6.9 G/DL (ref 6.4–8.2)
TRIGL SERPL-MCNC: 198 MG/DL (ref 0–150)
TSH REFLEX: 1.74 UIU/ML (ref 0.27–4.2)
VITAMIN B-12: 369 PG/ML (ref 211–911)
VITAMIN D 25-HYDROXY: 17.2 NG/ML
VLDLC SERPL CALC-MCNC: 40 MG/DL
WBC # BLD: 6.5 K/UL (ref 4–11)

## 2022-05-09 PROCEDURE — 90715 TDAP VACCINE 7 YRS/> IM: CPT | Performed by: FAMILY MEDICINE

## 2022-05-09 PROCEDURE — 82044 UR ALBUMIN SEMIQUANTITATIVE: CPT | Performed by: FAMILY MEDICINE

## 2022-05-09 PROCEDURE — 90471 IMMUNIZATION ADMIN: CPT | Performed by: FAMILY MEDICINE

## 2022-05-09 PROCEDURE — 83036 HEMOGLOBIN GLYCOSYLATED A1C: CPT | Performed by: FAMILY MEDICINE

## 2022-05-09 PROCEDURE — 99396 PREV VISIT EST AGE 40-64: CPT | Performed by: FAMILY MEDICINE

## 2022-05-09 RX ORDER — ATORVASTATIN CALCIUM 40 MG/1
TABLET, FILM COATED ORAL
Qty: 90 TABLET | Refills: 1 | Status: SHIPPED | OUTPATIENT
Start: 2022-05-09

## 2022-05-09 SDOH — ECONOMIC STABILITY: INCOME INSECURITY: IN THE LAST 12 MONTHS, WAS THERE A TIME WHEN YOU WERE NOT ABLE TO PAY THE MORTGAGE OR RENT ON TIME?: NO

## 2022-05-09 SDOH — ECONOMIC STABILITY: HOUSING INSECURITY
IN THE LAST 12 MONTHS, WAS THERE A TIME WHEN YOU DID NOT HAVE A STEADY PLACE TO SLEEP OR SLEPT IN A SHELTER (INCLUDING NOW)?: NO

## 2022-05-09 SDOH — ECONOMIC STABILITY: HOUSING INSECURITY: IN THE LAST 12 MONTHS, HOW MANY PLACES HAVE YOU LIVED?: 1

## 2022-05-09 SDOH — ECONOMIC STABILITY: FOOD INSECURITY: WITHIN THE PAST 12 MONTHS, THE FOOD YOU BOUGHT JUST DIDN'T LAST AND YOU DIDN'T HAVE MONEY TO GET MORE.: NEVER TRUE

## 2022-05-09 SDOH — ECONOMIC STABILITY: TRANSPORTATION INSECURITY
IN THE PAST 12 MONTHS, HAS LACK OF TRANSPORTATION KEPT YOU FROM MEETINGS, WORK, OR FROM GETTING THINGS NEEDED FOR DAILY LIVING?: NO

## 2022-05-09 SDOH — ECONOMIC STABILITY: TRANSPORTATION INSECURITY
IN THE PAST 12 MONTHS, HAS THE LACK OF TRANSPORTATION KEPT YOU FROM MEDICAL APPOINTMENTS OR FROM GETTING MEDICATIONS?: NO

## 2022-05-09 SDOH — ECONOMIC STABILITY: FOOD INSECURITY: WITHIN THE PAST 12 MONTHS, YOU WORRIED THAT YOUR FOOD WOULD RUN OUT BEFORE YOU GOT MONEY TO BUY MORE.: NEVER TRUE

## 2022-05-09 ASSESSMENT — LIFESTYLE VARIABLES
HOW MANY STANDARD DRINKS CONTAINING ALCOHOL DO YOU HAVE ON A TYPICAL DAY: 3 OR 4
HOW OFTEN DO YOU HAVE A DRINK CONTAINING ALCOHOL: 2-3 TIMES A WEEK

## 2022-05-09 ASSESSMENT — PATIENT HEALTH QUESTIONNAIRE - PHQ9
SUM OF ALL RESPONSES TO PHQ QUESTIONS 1-9: 0
SUM OF ALL RESPONSES TO PHQ QUESTIONS 1-9: 0
1. LITTLE INTEREST OR PLEASURE IN DOING THINGS: 0
SUM OF ALL RESPONSES TO PHQ QUESTIONS 1-9: 0
2. FEELING DOWN, DEPRESSED OR HOPELESS: 0
SUM OF ALL RESPONSES TO PHQ QUESTIONS 1-9: 0
SUM OF ALL RESPONSES TO PHQ9 QUESTIONS 1 & 2: 0

## 2022-05-09 ASSESSMENT — SOCIAL DETERMINANTS OF HEALTH (SDOH): HOW HARD IS IT FOR YOU TO PAY FOR THE VERY BASICS LIKE FOOD, HOUSING, MEDICAL CARE, AND HEATING?: NOT HARD AT ALL

## 2022-05-09 NOTE — PROGRESS NOTES
Herbie Blount is a 48 y.o. male    Chief Complaint   Patient presents with    Annual Exam    Other     Urine Urgency    Other     Belly button; protruding out some        HPI:    Preventative care. Flu shot: UTD  Tdap: desired    Diabetes  He presents for his follow-up diabetic visit. He has type 2 diabetes mellitus. His disease course has been worsening. Associated symptoms include polyuria. Pertinent negatives for diabetes include no polydipsia. Risk factors for coronary artery disease include diabetes mellitus. He is following a diabetic diet. An ACE inhibitor/angiotensin II receptor blocker is not being taken. Hyperlipidemia  This is a chronic problem. The current episode started more than 1 year ago. The problem is controlled. Recent lipid tests were reviewed and are normal. Exacerbating diseases include diabetes. Current antihyperlipidemic treatment includes statins. The current treatment provides significant improvement of lipids. There are no compliance problems. Risk factors for coronary artery disease include diabetes mellitus. ROS:    Review of Systems   Endocrine: Positive for polyuria. Negative for polydipsia. /74   Pulse 73   Wt 250 lb (113.4 kg)   SpO2 97%   BMI 32.98 kg/m²     Physical Exam:    Physical Exam  Constitutional:       General: He is not in acute distress. Appearance: Normal appearance. He is well-developed. He is not ill-appearing, toxic-appearing or diaphoretic. HENT:      Head: Normocephalic. Cardiovascular:      Rate and Rhythm: Normal rate and regular rhythm. Pulses: Normal pulses. Heart sounds: No murmur heard. Pulmonary:      Effort: Pulmonary effort is normal. No respiratory distress. Breath sounds: Normal breath sounds. No wheezing. Abdominal:      Hernia: A hernia is present. Hernia is present in the umbilical area. Musculoskeletal:      Cervical back: Normal range of motion. No rigidity.    Lymphadenopathy: Cervical: No cervical adenopathy. Neurological:      Mental Status: He is alert. Psychiatric:         Mood and Affect: Mood normal.         Behavior: Behavior normal.         Thought Content: Thought content normal.     Diabetic foot exam: sensation intact and equal bilaterally. Current Outpatient Medications   Medication Sig Dispense Refill    atorvastatin (LIPITOR) 40 MG tablet TAKE 1 TABLET BY MOUTH EVERY DAY 90 tablet 1    metFORMIN (GLUCOPHAGE-XR) 500 MG extended release tablet TAKE 2 TABLETS BY MOUTH TWICE A DAY WITH MEALS 360 tablet 1    Misc Natural Products (OSTEO BI-FLEX ADV DOUBLE ST) TABS Take 500 tablets by mouth 2 times daily      brimonidine (ALPHAGAN) 0.2 % ophthalmic solution 1 drop 3 times daily      dorzolamide-timolol (COSOPT) 22.3-6.8 MG/ML ophthalmic solution 1 drop 2 times daily      blood glucose monitor strips Dispense what insurance will cover. 100 strip 1    Lancets MISC Dispense lancets insurance will cover. (Patient not taking: Reported on 1/29/2021) 100 each 1    Misc. Devices MISC 1 each by Does not apply route once for 1 dose Dispense what patients insurance will cover. 1 Device 0    LUMIGAN 0.01 % SOLN ophthalmic drops   5     No current facility-administered medications for this visit. Assessment:    1. Preventative health care    2. Type 2 diabetes mellitus without complication, without long-term current use of insulin ([address]    3. Mixed hyperlipidemia    4. Screening for colon cancer    5. Vitamin D deficiency    6. Screening PSA (prostate specific antigen)    7. Need for Tdap vaccination        Plan:    1. Preventative health care    2. Type 2 diabetes mellitus without complication, without long-term current use of insulin (HCC)  Worsening. Encouraged compliance. - POCT glycosylated hemoglobin (Hb A1C) 9.5  - metFORMIN, MOD, (GLUMETZA) 500 MG extended release tablet; Take 2 tablets by mouth 2 times daily (with meals)  Dispense: 360 tablet; Refill: 1    3. Mixed hyperlipidemia  Stable. Continue current medications. - atorvastatin (LIPITOR) 40 MG tablet; Take 1 tablet by mouth daily  Dispense: 90 tablet; Refill: 1    Encouraged wearing a brace for the umbilical hernia. The frequent urination is likely related to worsening diabetes. Return in about 6 months (around 11/9/2022) for Diabetes, Hypertension, Hyperlipidemia.

## 2022-11-11 DIAGNOSIS — E78.2 MIXED HYPERLIPIDEMIA: ICD-10-CM

## 2022-11-11 NOTE — TELEPHONE ENCOUNTER
Refill Request     CONFIRM preferrred pharmacy with the patient. If Mail Order Rx - Pend for 90 day refill. Last Seen: Last Seen Department: 5/9/2022  Last Seen by PCP: 5/9/2022    Last Written: 05/09/2022 90 tablet 1 refills     If no future appointment scheduled, route STAFF MESSAGE with patient name to the [facility] for scheduling. Next Appointment:   No future appointments. Message sent to [address] [place] [address] to schedule appt with patient? YES  Return in about 6 months (around 11/9/2022) for Diabetes, Hypertension, Hyperlipidemia.     Requested Prescriptions     Pending Prescriptions Disp Refills    atorvastatin (LIPITOR) 40 MG tablet [Pharmacy Med Name: ATORVASTATIN 40 MG TABLET] 90 tablet 1     Sig: TAKE 1 TABLET BY MOUTH EVERY DAY

## 2022-11-14 RX ORDER — ATORVASTATIN CALCIUM 40 MG/1
TABLET, FILM COATED ORAL
Qty: 90 TABLET | Refills: 0 | Status: SHIPPED | OUTPATIENT
Start: 2022-11-14

## 2023-02-05 DIAGNOSIS — E11.9 TYPE 2 DIABETES MELLITUS WITHOUT COMPLICATION, WITHOUT LONG-TERM CURRENT USE OF INSULIN (HCC): ICD-10-CM

## 2023-02-06 NOTE — TELEPHONE ENCOUNTER
Refill Request     CONFIRM preferrred pharmacy with the patient. If Mail Order Rx - Pend for 90 day refill. Last Seen: Last Seen Department: 5/9/2022  Last Seen by PCP: 5/9/2022    Last Written: 3/22/2022    If no future appointment scheduled, route STAFF MESSAGE with patient name to the [facility] for scheduling. Next Appointment:   No future appointments. Message sent to [address] [place] [address] to schedule appt with patient?   NO      Requested Prescriptions     Pending Prescriptions Disp Refills    metFORMIN (GLUCOPHAGE-XR) 500 MG extended release tablet [Pharmacy Med Name: METFORMIN HCL  MG TABLET] 360 tablet 1     Sig: TAKE 2 TABLETS BY MOUTH TWICE A DAY WITH MEALS

## 2023-02-07 RX ORDER — METFORMIN HYDROCHLORIDE 500 MG/1
TABLET, EXTENDED RELEASE ORAL
Qty: 360 TABLET | Refills: 1 | Status: SHIPPED | OUTPATIENT
Start: 2023-02-07

## 2023-02-16 DIAGNOSIS — E78.2 MIXED HYPERLIPIDEMIA: ICD-10-CM

## 2023-02-16 NOTE — TELEPHONE ENCOUNTER
Refill Request     CONFIRM preferrred pharmacy with the patient. If Mail Order Rx - Pend for 90 day refill. Last Seen: Last Seen Department: 5/9/2022  Last Seen by PCP: 5/9/2022    Last Written: 11/14/2022    If no future appointment scheduled, route STAFF MESSAGE with patient name to the [facility] for scheduling. Next Appointment:   No future appointments. Message sent to [address] [place] [address] to schedule appt with patient?   NO      Requested Prescriptions     Pending Prescriptions Disp Refills    atorvastatin (LIPITOR) 40 MG tablet [Pharmacy Med Name: ATORVASTATIN 40 MG TABLET] 90 tablet 0     Sig: TAKE 1 TABLET BY MOUTH EVERY DAY

## 2023-02-17 RX ORDER — ATORVASTATIN CALCIUM 40 MG/1
TABLET, FILM COATED ORAL
Qty: 90 TABLET | Refills: 0 | Status: SHIPPED | OUTPATIENT
Start: 2023-02-17

## 2023-03-22 ENCOUNTER — TELEPHONE (OUTPATIENT)
Dept: FAMILY MEDICINE CLINIC | Age: 48
End: 2023-03-22

## 2023-03-22 NOTE — TELEPHONE ENCOUNTER
Attempted to reach the PT to schedule DM and A1C follow up. VM left to have the PT return call to schedule. Hospice met with patient and daughter Eitan Roque. Patient was reportedly unaware that stopping dialysis would mean ending life. This was explained to patient and that without dialysis his likelihood of passing sooner would occur. Patient and daughter wishing to proceed with continued care. Order placed for central line placement to IR if available. Will reach out to ICU to see if they are able to place a line. Pending placement, will obtain a CTA of the abdomen and pelvis. Patient will undergo cortisol testing in the morning to rule out adrenal insufficiency. Recheck potassium as it was elevated this morning. Nephrology will be contacted to inform them that patient is no longer considering hospice.     Electronically signed by Gregorio Smith PA-C on 10/3/2021 at 2:24 PM

## 2023-05-23 DIAGNOSIS — E11.9 TYPE 2 DIABETES MELLITUS WITHOUT COMPLICATION, WITHOUT LONG-TERM CURRENT USE OF INSULIN (HCC): ICD-10-CM

## 2023-05-23 RX ORDER — METFORMIN HYDROCHLORIDE 500 MG/1
TABLET, EXTENDED RELEASE ORAL
Qty: 28 TABLET | Refills: 0 | Status: SHIPPED | OUTPATIENT
Start: 2023-05-23

## 2023-05-23 NOTE — TELEPHONE ENCOUNTER
Patient is requesting a weeks worth of medication, this will get him through until his next appt. Refill Request     CONFIRM preferred pharmacy with the patient. If Mail Order Rx - Pend for 90 day refill. Last Seen: Last Seen Department: 5/9/2022  Last Seen by PCP: 5/9/2022    Last Written: 2/7/2023 360 tabs 1 refill    If no future appointment scheduled:  Review the last OV with PCP and review information for follow-up visit,  Route STAFF MESSAGE with patient name to the [facility] for scheduling with the following information:            -  Timing of next visit           -  Visit type ie Physical, OV, etc           -  Diagnoses/Reason ie. COPD, HTN - Do not use MEDICATION, Follow-up or CHECK UP - Give reason for visit      Next Appointment:   Future Appointments   Date Time Provider Dianna Lopez   6/2/2023  8:15 AM DO [facility] [place] - [facility]       Message sent to [facility] to schedule appt with patient?   NO      Requested Prescriptions     Pending Prescriptions Disp Refills    metFORMIN (GLUCOPHAGE-XR) 500 MG extended release tablet 28 tablet 0     Sig: TAKE 2 TABLETS BY MOUTH TWICE A DAY WITH MEALS

## 2023-05-24 DIAGNOSIS — E78.2 MIXED HYPERLIPIDEMIA: ICD-10-CM

## 2023-05-24 NOTE — TELEPHONE ENCOUNTER
Refill Request     CONFIRM preferred pharmacy with the patient. If Mail Order Rx - Pend for 90 day refill. Last Seen: Last Seen Department: 5/9/2022  Last Seen by PCP: 5/9/2022    Last Written: 2/17/2023, #90, 0 refills    If no future appointment scheduled:  Review the last OV with PCP and review information for follow-up visit,  Route STAFF MESSAGE with patient name to the [facility] for scheduling with the following information:            -  Timing of next visit           -  Visit type ie Physical, OV, etc           -  Diagnoses/Reason ie. COPD, HTN - Do not use MEDICATION, Follow-up or CHECK UP - Give reason for visit      Next Appointment:   Future Appointments   Date Time Provider Dianna Lopez   6/2/2023  8:15 AM DO HY[facility] [place] - [facility]       Message sent to [employer] to schedule appt with patient?   NO      Requested Prescriptions     Pending Prescriptions Disp Refills    atorvastatin (LIPITOR) 40 MG tablet [Pharmacy Med Name: ATORVASTATIN 40 MG TABLET] 90 tablet 0     Sig: TAKE 1 TABLET BY MOUTH EVERY DAY

## 2023-05-25 RX ORDER — ATORVASTATIN CALCIUM 40 MG/1
TABLET, FILM COATED ORAL
Qty: 90 TABLET | Refills: 0 | Status: SHIPPED | OUTPATIENT
Start: 2023-05-25

## 2023-06-02 ENCOUNTER — OFFICE VISIT (OUTPATIENT)
Dept: FAMILY MEDICINE CLINIC | Age: 48
End: 2023-06-02

## 2023-06-02 VITALS
HEART RATE: 71 BPM | WEIGHT: 237 LBS | BODY MASS INDEX: 31.27 KG/M2 | DIASTOLIC BLOOD PRESSURE: 80 MMHG | OXYGEN SATURATION: 95 % | SYSTOLIC BLOOD PRESSURE: 132 MMHG

## 2023-06-02 DIAGNOSIS — E55.9 VITAMIN D DEFICIENCY: ICD-10-CM

## 2023-06-02 DIAGNOSIS — E78.2 MIXED HYPERLIPIDEMIA: ICD-10-CM

## 2023-06-02 DIAGNOSIS — Z00.00 PREVENTATIVE HEALTH CARE: Primary | ICD-10-CM

## 2023-06-02 DIAGNOSIS — E11.9 TYPE 2 DIABETES MELLITUS WITHOUT COMPLICATION, WITHOUT LONG-TERM CURRENT USE OF INSULIN (HCC): ICD-10-CM

## 2023-06-02 LAB
25(OH)D3 SERPL-MCNC: 26.8 NG/ML
ALBUMIN SERPL-MCNC: 4.5 G/DL (ref 3.4–5)
ALBUMIN/GLOB SERPL: 1.9 {RATIO} (ref 1.1–2.2)
ALP SERPL-CCNC: 93 U/L (ref 40–129)
ALT SERPL-CCNC: 42 U/L (ref 10–40)
ANION GAP SERPL CALCULATED.3IONS-SCNC: 13 MMOL/L (ref 3–16)
AST SERPL-CCNC: 36 U/L (ref 15–37)
BASOPHILS # BLD: 0 K/UL (ref 0–0.2)
BASOPHILS NFR BLD: 0.7 %
BILIRUB SERPL-MCNC: 0.5 MG/DL (ref 0–1)
BUN SERPL-MCNC: 16 MG/DL (ref 7–20)
CALCIUM SERPL-MCNC: 9.4 MG/DL (ref 8.3–10.6)
CHLORIDE SERPL-SCNC: 105 MMOL/L (ref 99–110)
CHOLEST SERPL-MCNC: 121 MG/DL (ref 0–199)
CO2 SERPL-SCNC: 23 MMOL/L (ref 21–32)
CREAT SERPL-MCNC: 1 MG/DL (ref 0.9–1.3)
CREATININE URINE POCT: 300
DEPRECATED RDW RBC AUTO: 13.5 % (ref 12.4–15.4)
EOSINOPHIL # BLD: 0.1 K/UL (ref 0–0.6)
EOSINOPHIL NFR BLD: 1.6 %
FOLATE SERPL-MCNC: 12.74 NG/ML (ref 4.78–24.2)
GFR SERPLBLD CREATININE-BSD FMLA CKD-EPI: >60 ML/MIN/{1.73_M2}
GLUCOSE SERPL-MCNC: 147 MG/DL (ref 70–99)
HBA1C MFR BLD: 8.9 %
HCT VFR BLD AUTO: 45 % (ref 40.5–52.5)
HDLC SERPL-MCNC: 48 MG/DL (ref 40–60)
HGB BLD-MCNC: 15.2 G/DL (ref 13.5–17.5)
LDLC SERPL CALC-MCNC: 60 MG/DL
LYMPHOCYTES # BLD: 1.6 K/UL (ref 1–5.1)
LYMPHOCYTES NFR BLD: 29.4 %
MCH RBC QN AUTO: 32 PG (ref 26–34)
MCHC RBC AUTO-ENTMCNC: 33.7 G/DL (ref 31–36)
MCV RBC AUTO: 94.8 FL (ref 80–100)
MICROALBUMIN/CREAT 24H UR: 30 MG/G{CREAT}
MICROALBUMIN/CREAT UR-RTO: <30
MONOCYTES # BLD: 0.4 K/UL (ref 0–1.3)
MONOCYTES NFR BLD: 8.2 %
NEUTROPHILS # BLD: 3.2 K/UL (ref 1.7–7.7)
NEUTROPHILS NFR BLD: 60.1 %
PLATELET # BLD AUTO: 223 K/UL (ref 135–450)
PMV BLD AUTO: 8.5 FL (ref 5–10.5)
POTASSIUM SERPL-SCNC: 4.1 MMOL/L (ref 3.5–5.1)
PROT SERPL-MCNC: 6.9 G/DL (ref 6.4–8.2)
RBC # BLD AUTO: 4.75 M/UL (ref 4.2–5.9)
SODIUM SERPL-SCNC: 141 MMOL/L (ref 136–145)
TRIGL SERPL-MCNC: 65 MG/DL (ref 0–150)
TSH SERPL DL<=0.005 MIU/L-ACNC: 1.4 UIU/ML (ref 0.27–4.2)
VIT B12 SERPL-MCNC: 259 PG/ML (ref 211–911)
VLDLC SERPL CALC-MCNC: 13 MG/DL
WBC # BLD AUTO: 5.3 K/UL (ref 4–11)

## 2023-06-02 RX ORDER — GLIPIZIDE 10 MG/1
10 TABLET ORAL
Qty: 60 TABLET | Refills: 5 | Status: SHIPPED | OUTPATIENT
Start: 2023-06-02

## 2023-06-02 RX ORDER — METFORMIN HYDROCHLORIDE 500 MG/1
TABLET, EXTENDED RELEASE ORAL
Qty: 360 TABLET | Refills: 1 | Status: SHIPPED | OUTPATIENT
Start: 2023-06-02

## 2023-06-02 RX ORDER — ATORVASTATIN CALCIUM 40 MG/1
40 TABLET, FILM COATED ORAL DAILY
Qty: 90 TABLET | Refills: 3 | Status: SHIPPED | OUTPATIENT
Start: 2023-06-02

## 2023-06-02 ASSESSMENT — PATIENT HEALTH QUESTIONNAIRE - PHQ9
SUM OF ALL RESPONSES TO PHQ9 QUESTIONS 1 & 2: 0
6. FEELING BAD ABOUT YOURSELF - OR THAT YOU ARE A FAILURE OR HAVE LET YOURSELF OR YOUR FAMILY DOWN: 0
1. LITTLE INTEREST OR PLEASURE IN DOING THINGS: 0
SUM OF ALL RESPONSES TO PHQ QUESTIONS 1-9: 0
3. TROUBLE FALLING OR STAYING ASLEEP: 0
5. POOR APPETITE OR OVEREATING: 0
4. FEELING TIRED OR HAVING LITTLE ENERGY: 0
7. TROUBLE CONCENTRATING ON THINGS, SUCH AS READING THE NEWSPAPER OR WATCHING TELEVISION: 0
10. IF YOU CHECKED OFF ANY PROBLEMS, HOW DIFFICULT HAVE THESE PROBLEMS MADE IT FOR YOU TO DO YOUR WORK, TAKE CARE OF THINGS AT HOME, OR GET ALONG WITH OTHER PEOPLE: 0
2. FEELING DOWN, DEPRESSED OR HOPELESS: 0
SUM OF ALL RESPONSES TO PHQ QUESTIONS 1-9: 0
SUM OF ALL RESPONSES TO PHQ QUESTIONS 1-9: 0
8. MOVING OR SPEAKING SO SLOWLY THAT OTHER PEOPLE COULD HAVE NOTICED. OR THE OPPOSITE, BEING SO FIGETY OR RESTLESS THAT YOU HAVE BEEN MOVING AROUND A LOT MORE THAN USUAL: 0
SUM OF ALL RESPONSES TO PHQ QUESTIONS 1-9: 0
9. THOUGHTS THAT YOU WOULD BE BETTER OFF DEAD, OR OF HURTING YOURSELF: 0

## 2023-06-02 ASSESSMENT — ANXIETY QUESTIONNAIRES
IF YOU CHECKED OFF ANY PROBLEMS ON THIS QUESTIONNAIRE, HOW DIFFICULT HAVE THESE PROBLEMS MADE IT FOR YOU TO DO YOUR WORK, TAKE CARE OF THINGS AT HOME, OR GET ALONG WITH OTHER PEOPLE: NOT DIFFICULT AT ALL
3. WORRYING TOO MUCH ABOUT DIFFERENT THINGS: 0
1. FEELING NERVOUS, ANXIOUS, OR ON EDGE: 0
GAD7 TOTAL SCORE: 0
7. FEELING AFRAID AS IF SOMETHING AWFUL MIGHT HAPPEN: 0
6. BECOMING EASILY ANNOYED OR IRRITABLE: 0
5. BEING SO RESTLESS THAT IT IS HARD TO SIT STILL: 0
4. TROUBLE RELAXING: 0
2. NOT BEING ABLE TO STOP OR CONTROL WORRYING: 0

## 2023-06-02 NOTE — PROGRESS NOTES
Barby Macdonald is a 50 y.o. male    Chief Complaint   Patient presents with    Diabetes    6 Month Follow-Up    Blood Work    Other     Has not giving a Cologuard sample- wife will throw away the box at the end of the month if patient does not complete sampling-        HPI:    Preventative care. Flu shot: UTD  Tdap: UTD    Diabetes  He presents for his follow-up diabetic visit. He has type 2 diabetes mellitus. His disease course has been worsening. Associated symptoms include polyuria. Pertinent negatives for diabetes include no polydipsia. Risk factors for coronary artery disease include diabetes mellitus. He is following a diabetic diet. An ACE inhibitor/angiotensin II receptor blocker is not being taken. Hyperlipidemia  This is a chronic problem. The current episode started more than 1 year ago. The problem is controlled. Recent lipid tests were reviewed and are normal. Exacerbating diseases include diabetes. Current antihyperlipidemic treatment includes statins. The current treatment provides significant improvement of lipids. There are no compliance problems. Risk factors for coronary artery disease include diabetes mellitus. ROS:    Review of Systems   Endocrine: Positive for polyuria. Negative for polydipsia. /80   Pulse 71   Wt 237 lb (107.5 kg)   SpO2 95%   BMI 31.27 kg/m²     Physical Exam:    Physical Exam  Constitutional:       General: He is not in acute distress. Appearance: Normal appearance. He is well-developed. He is obese. He is not ill-appearing, toxic-appearing or diaphoretic. HENT:      Head: Normocephalic. Cardiovascular:      Rate and Rhythm: Normal rate and regular rhythm. Pulses: Normal pulses. Heart sounds: No murmur heard. Pulmonary:      Effort: Pulmonary effort is normal. No respiratory distress. Breath sounds: Normal breath sounds. No wheezing. Musculoskeletal:      Cervical back: Normal range of motion. No rigidity.

## 2023-06-24 DIAGNOSIS — E11.9 TYPE 2 DIABETES MELLITUS WITHOUT COMPLICATION, WITHOUT LONG-TERM CURRENT USE OF INSULIN (HCC): ICD-10-CM

## 2023-06-26 RX ORDER — GLIPIZIDE 10 MG/1
TABLET ORAL
Qty: 60 TABLET | Refills: 5 | Status: SHIPPED | OUTPATIENT
Start: 2023-06-26

## 2024-04-29 ENCOUNTER — OFFICE VISIT (OUTPATIENT)
Dept: FAMILY MEDICINE CLINIC | Age: 49
End: 2024-04-29
Payer: COMMERCIAL

## 2024-04-29 VITALS — BODY MASS INDEX: 31.66 KG/M2 | DIASTOLIC BLOOD PRESSURE: 74 MMHG | WEIGHT: 240 LBS | SYSTOLIC BLOOD PRESSURE: 136 MMHG

## 2024-04-29 DIAGNOSIS — Z12.11 SCREENING FOR COLON CANCER: ICD-10-CM

## 2024-04-29 DIAGNOSIS — E78.2 MIXED HYPERLIPIDEMIA: ICD-10-CM

## 2024-04-29 DIAGNOSIS — Z00.00 PREVENTATIVE HEALTH CARE: Primary | ICD-10-CM

## 2024-04-29 DIAGNOSIS — E11.9 TYPE 2 DIABETES MELLITUS WITHOUT COMPLICATION, WITHOUT LONG-TERM CURRENT USE OF INSULIN (HCC): ICD-10-CM

## 2024-04-29 LAB — HBA1C MFR BLD: 9.3 %

## 2024-04-29 PROCEDURE — 99396 PREV VISIT EST AGE 40-64: CPT | Performed by: FAMILY MEDICINE

## 2024-04-29 PROCEDURE — 83036 HEMOGLOBIN GLYCOSYLATED A1C: CPT | Performed by: FAMILY MEDICINE

## 2024-04-29 RX ORDER — GLIPIZIDE 10 MG/1
10 TABLET ORAL
Qty: 180 TABLET | Refills: 1 | Status: SHIPPED | OUTPATIENT
Start: 2024-04-29

## 2024-04-29 RX ORDER — ATORVASTATIN CALCIUM 40 MG/1
40 TABLET, FILM COATED ORAL DAILY
Qty: 90 TABLET | Refills: 3 | Status: SHIPPED | OUTPATIENT
Start: 2024-04-29

## 2024-04-29 RX ORDER — METFORMIN HYDROCHLORIDE 500 MG/1
TABLET, EXTENDED RELEASE ORAL
Qty: 360 TABLET | Refills: 1 | Status: SHIPPED | OUTPATIENT
Start: 2024-04-29

## 2024-04-29 NOTE — PROGRESS NOTES
William B von Hoene is a 48 y.o. male    Chief Complaint   Patient presents with    Diabetes    Annual Exam       HPI:    Preventative care.  Flu shot: UTD  Tdap: UTD    Diabetes  He presents for his follow-up diabetic visit. He has type 2 diabetes mellitus. His disease course has been worsening. Associated symptoms include polyuria. Pertinent negatives for diabetes include no polydipsia. Risk factors for coronary artery disease include diabetes mellitus. He is following a diabetic diet. An ACE inhibitor/angiotensin II receptor blocker is not being taken.   Hyperlipidemia  This is a chronic problem. The current episode started more than 1 year ago. The problem is controlled. Recent lipid tests were reviewed and are normal. Exacerbating diseases include diabetes. Current antihyperlipidemic treatment includes statins. The current treatment provides significant improvement of lipids. There are no compliance problems.  Risk factors for coronary artery disease include diabetes mellitus.       ROS:    Review of Systems   Endocrine: Positive for polyuria. Negative for polydipsia.       /74   Wt 108.9 kg (240 lb)   BMI 31.66 kg/m²     Physical Exam:    Physical Exam  Constitutional:       General: He is not in acute distress.     Appearance: Normal appearance. He is well-developed. He is obese. He is not ill-appearing, toxic-appearing or diaphoretic.   HENT:      Head: Normocephalic.   Cardiovascular:      Rate and Rhythm: Normal rate and regular rhythm.      Pulses: Normal pulses.      Heart sounds: No murmur heard.  Pulmonary:      Effort: Pulmonary effort is normal. No respiratory distress.      Breath sounds: Normal breath sounds. No wheezing.   Musculoskeletal:      Cervical back: Normal range of motion. No rigidity.   Lymphadenopathy:      Cervical: No cervical adenopathy.   Neurological:      Mental Status: He is alert.   Psychiatric:         Mood and Affect: Mood normal.         Behavior: Behavior normal.

## 2024-05-31 LAB — NONINV COLON CA DNA+OCC BLD SCRN STL QL: NEGATIVE

## 2024-12-12 ENCOUNTER — TELEPHONE (OUTPATIENT)
Dept: FAMILY MEDICINE CLINIC | Age: 49
End: 2024-12-12

## 2025-05-16 ENCOUNTER — OFFICE VISIT (OUTPATIENT)
Dept: FAMILY MEDICINE CLINIC | Age: 50
End: 2025-05-16
Payer: COMMERCIAL

## 2025-05-16 VITALS
HEART RATE: 78 BPM | BODY MASS INDEX: 31.83 KG/M2 | DIASTOLIC BLOOD PRESSURE: 80 MMHG | OXYGEN SATURATION: 97 % | WEIGHT: 240.2 LBS | HEIGHT: 73 IN | SYSTOLIC BLOOD PRESSURE: 112 MMHG

## 2025-05-16 DIAGNOSIS — E78.2 MIXED HYPERLIPIDEMIA: ICD-10-CM

## 2025-05-16 DIAGNOSIS — Z00.00 PREVENTATIVE HEALTH CARE: Primary | ICD-10-CM

## 2025-05-16 DIAGNOSIS — E11.9 TYPE 2 DIABETES MELLITUS WITHOUT COMPLICATION, WITHOUT LONG-TERM CURRENT USE OF INSULIN (HCC): ICD-10-CM

## 2025-05-16 DIAGNOSIS — E55.9 VITAMIN D DEFICIENCY: ICD-10-CM

## 2025-05-16 DIAGNOSIS — Z12.5 SCREENING PSA (PROSTATE SPECIFIC ANTIGEN): ICD-10-CM

## 2025-05-16 DIAGNOSIS — Z23 NEED FOR PNEUMOCOCCAL VACCINATION: ICD-10-CM

## 2025-05-16 DIAGNOSIS — Z00.00 PREVENTATIVE HEALTH CARE: ICD-10-CM

## 2025-05-16 LAB — HBA1C MFR BLD: 10.5 %

## 2025-05-16 PROCEDURE — 83036 HEMOGLOBIN GLYCOSYLATED A1C: CPT | Performed by: FAMILY MEDICINE

## 2025-05-16 PROCEDURE — 90471 IMMUNIZATION ADMIN: CPT | Performed by: FAMILY MEDICINE

## 2025-05-16 PROCEDURE — 90677 PCV20 VACCINE IM: CPT | Performed by: FAMILY MEDICINE

## 2025-05-16 PROCEDURE — 99396 PREV VISIT EST AGE 40-64: CPT | Performed by: FAMILY MEDICINE

## 2025-05-16 RX ORDER — GLUCOSAMINE HCL/CHONDROITIN SU 500-400 MG
CAPSULE ORAL
Qty: 100 STRIP | Refills: 11 | Status: SHIPPED | OUTPATIENT
Start: 2025-05-16

## 2025-05-16 RX ORDER — AVOBENZONE, HOMOSALATE, OCTISALATE, OCTOCRYLENE 30; 40; 45; 26 MG/ML; MG/ML; MG/ML; MG/ML
1 CREAM TOPICAL 2 TIMES DAILY
Qty: 100 EACH | Refills: 11 | Status: SHIPPED | OUTPATIENT
Start: 2025-05-16

## 2025-05-16 SDOH — ECONOMIC STABILITY: FOOD INSECURITY: WITHIN THE PAST 12 MONTHS, THE FOOD YOU BOUGHT JUST DIDN'T LAST AND YOU DIDN'T HAVE MONEY TO GET MORE.: NEVER TRUE

## 2025-05-16 SDOH — ECONOMIC STABILITY: FOOD INSECURITY: WITHIN THE PAST 12 MONTHS, YOU WORRIED THAT YOUR FOOD WOULD RUN OUT BEFORE YOU GOT MONEY TO BUY MORE.: NEVER TRUE

## 2025-05-16 ASSESSMENT — PATIENT HEALTH QUESTIONNAIRE - PHQ9: DEPRESSION UNABLE TO ASSESS: PT REFUSES

## 2025-05-16 NOTE — PROGRESS NOTES
William B von Hoene is a 49 y.o. male    Chief Complaint   Patient presents with    Annual Exam     Blood work-prostate test       HPI:    Preventative care.  Flu shot: UTD  Tdap: UTD  He desires a pneumonia shot.    Diabetes  He presents for his follow-up diabetic visit. He has type 2 diabetes mellitus. His disease course has been worsening. Associated symptoms include polyuria. Pertinent negatives for diabetes include no polydipsia. Risk factors for coronary artery disease include diabetes mellitus. He is following a diabetic diet. An ACE inhibitor/angiotensin II receptor blocker is not being taken.   Hyperlipidemia  This is a chronic problem. The current episode started more than 1 year ago. The problem is controlled. Recent lipid tests were reviewed and are normal. Exacerbating diseases include diabetes. Current antihyperlipidemic treatment includes statins. The current treatment provides significant improvement of lipids. There are no compliance problems.  Risk factors for coronary artery disease include diabetes mellitus.       ROS:    Review of Systems   Endocrine: Positive for polyuria. Negative for polydipsia.       /80 (BP Site: Right Upper Arm, Patient Position: Sitting, BP Cuff Size: Large Adult)   Pulse 78   Ht 1.854 m (6' 0.99\")   Wt 109 kg (240 lb 3.2 oz)   SpO2 97%   BMI 31.70 kg/m²     Physical Exam:    Physical Exam  Constitutional:       General: He is not in acute distress.     Appearance: Normal appearance. He is well-developed. He is obese. He is not ill-appearing, toxic-appearing or diaphoretic.   HENT:      Head: Normocephalic.   Cardiovascular:      Rate and Rhythm: Normal rate and regular rhythm.      Pulses: Normal pulses.      Heart sounds: No murmur heard.  Pulmonary:      Effort: Pulmonary effort is normal. No respiratory distress.      Breath sounds: Normal breath sounds. No wheezing.   Musculoskeletal:      Cervical back: Normal range of motion. No rigidity.

## 2025-05-17 LAB
25(OH)D3 SERPL-MCNC: 33.1 NG/ML
ALBUMIN SERPL-MCNC: 4.5 G/DL (ref 3.4–5)
ALBUMIN/GLOB SERPL: 2.1 {RATIO} (ref 1.1–2.2)
ALP SERPL-CCNC: 111 U/L (ref 40–129)
ALT SERPL-CCNC: 46 U/L (ref 10–40)
ANION GAP SERPL CALCULATED.3IONS-SCNC: 11 MMOL/L (ref 3–16)
AST SERPL-CCNC: 29 U/L (ref 15–37)
BASOPHILS # BLD: 0 K/UL (ref 0–0.2)
BASOPHILS NFR BLD: 0.6 %
BILIRUB SERPL-MCNC: 0.7 MG/DL (ref 0–1)
BUN SERPL-MCNC: 14 MG/DL (ref 7–20)
CALCIUM SERPL-MCNC: 9.7 MG/DL (ref 8.3–10.6)
CHLORIDE SERPL-SCNC: 103 MMOL/L (ref 99–110)
CHOLEST SERPL-MCNC: 165 MG/DL (ref 0–199)
CO2 SERPL-SCNC: 24 MMOL/L (ref 21–32)
CREAT SERPL-MCNC: 0.9 MG/DL (ref 0.9–1.3)
CREAT UR-MCNC: 167 MG/DL (ref 39–259)
DEPRECATED RDW RBC AUTO: 13.4 % (ref 12.4–15.4)
EOSINOPHIL # BLD: 0.1 K/UL (ref 0–0.6)
EOSINOPHIL NFR BLD: 2.3 %
GFR SERPLBLD CREATININE-BSD FMLA CKD-EPI: >90 ML/MIN/{1.73_M2}
GLUCOSE SERPL-MCNC: 245 MG/DL (ref 70–99)
HCT VFR BLD AUTO: 47.2 % (ref 40.5–52.5)
HDLC SERPL-MCNC: 49 MG/DL (ref 40–60)
HGB BLD-MCNC: 16.5 G/DL (ref 13.5–17.5)
LDLC SERPL CALC-MCNC: 92 MG/DL
LYMPHOCYTES # BLD: 1.6 K/UL (ref 1–5.1)
LYMPHOCYTES NFR BLD: 25.9 %
MCH RBC QN AUTO: 32.7 PG (ref 26–34)
MCHC RBC AUTO-ENTMCNC: 34.9 G/DL (ref 31–36)
MCV RBC AUTO: 93.5 FL (ref 80–100)
MICROALBUMIN UR DL<=1MG/L-MCNC: <1.2 MG/DL
MICROALBUMIN/CREAT UR: NORMAL MG/G (ref 0–30)
MONOCYTES # BLD: 0.6 K/UL (ref 0–1.3)
MONOCYTES NFR BLD: 9.4 %
NEUTROPHILS # BLD: 3.9 K/UL (ref 1.7–7.7)
NEUTROPHILS NFR BLD: 61.8 %
PLATELET # BLD AUTO: 249 K/UL (ref 135–450)
PMV BLD AUTO: 9.2 FL (ref 5–10.5)
POTASSIUM SERPL-SCNC: 4.6 MMOL/L (ref 3.5–5.1)
PROT SERPL-MCNC: 6.6 G/DL (ref 6.4–8.2)
PSA SERPL DL<=0.01 NG/ML-MCNC: 0.92 NG/ML (ref 0–4)
RBC # BLD AUTO: 5.05 M/UL (ref 4.2–5.9)
SODIUM SERPL-SCNC: 138 MMOL/L (ref 136–145)
TRIGL SERPL-MCNC: 121 MG/DL (ref 0–150)
TSH SERPL DL<=0.005 MIU/L-ACNC: 1.29 UIU/ML (ref 0.27–4.2)
VLDLC SERPL CALC-MCNC: 24 MG/DL
WBC # BLD AUTO: 6.4 K/UL (ref 4–11)

## 2025-05-19 ENCOUNTER — RESULTS FOLLOW-UP (OUTPATIENT)
Dept: FAMILY MEDICINE CLINIC | Age: 50
End: 2025-05-19

## 2025-07-11 DIAGNOSIS — E78.2 MIXED HYPERLIPIDEMIA: ICD-10-CM

## 2025-07-11 RX ORDER — ATORVASTATIN CALCIUM 40 MG/1
40 TABLET, FILM COATED ORAL DAILY
Qty: 90 TABLET | Refills: 3 | Status: SHIPPED | OUTPATIENT
Start: 2025-07-11

## 2025-07-11 NOTE — TELEPHONE ENCOUNTER
Refill Request     CONFIRM preferred pharmacy with the patient.    If Mail Order Rx - Pend for 90 day refill.      Last Seen: Last Seen Department: 5/16/2025  Last Seen by PCP: 5/16/2025    Last Written: 4/29/24 90 with 3 refill     If no future appointment scheduled:  Review the last OV with PCP and review information for follow-up visit,  Route STAFF MESSAGE with patient name to the  Pool for scheduling with the following information:            -  Timing of next visit           -  Visit type ie Physical, OV, etc           -  Diagnoses/Reason ie. COPD, HTN - Do not use MEDICATION, Follow-up or CHECK UP - Give reason for visit      Next Appointment:   Future Appointments   Date Time Provider Department Center   12/19/2025  8:00 AM Jie Esposito DO [facility]       Message sent to  to schedule appt with patient?  NO      Requested Prescriptions     Pending Prescriptions Disp Refills    atorvastatin (LIPITOR) 40 MG tablet [Pharmacy Med Name: ATORVASTATIN 40 MG TABLET] 90 tablet 3     Sig: TAKE 1 TABLET BY MOUTH DAILY DO NOT FILL UNTIL DESIRED.

## 2025-07-25 DIAGNOSIS — E11.9 TYPE 2 DIABETES MELLITUS WITHOUT COMPLICATION, WITHOUT LONG-TERM CURRENT USE OF INSULIN (HCC): ICD-10-CM

## 2025-07-25 RX ORDER — GLIPIZIDE 10 MG/1
20 TABLET ORAL
Qty: 180 TABLET | Refills: 1 | Status: SHIPPED | OUTPATIENT
Start: 2025-07-25

## 2025-07-25 NOTE — TELEPHONE ENCOUNTER
.Refill Request     CONFIRM preferred pharmacy with the patient.    If Mail Order Rx - Pend for 90 day refill.      Last Seen: Last Seen Department: 5/16/2025  Last Seen by PCP: 5/16/2025    Last Written: 4-29-24 180 with 1     If no future appointment scheduled:  Review the last OV with PCP and review information for follow-up visit,  Route STAFF MESSAGE with patient name to the  Pool for scheduling with the following information:            -  Timing of next visit           -  Visit type ie Physical, OV, etc           -  Diagnoses/Reason ie. COPD, HTN - Do not use MEDICATION, Follow-up or CHECK UP - Give reason for visit      Next Appointment:   Future Appointments   Date Time Provider Department Center   12/19/2025  8:00 AM Jie Esposito DO [facility]       Message sent to  to schedule appt with patient?  NO      Requested Prescriptions     Pending Prescriptions Disp Refills    glipiZIDE (GLUCOTROL) 10 MG tablet [Pharmacy Med Name: GLIPIZIDE 10 MG TABLET] 180 tablet 1     Sig: TAKE 1 TABLET BY MOUTH TWICE A DAY BEFORE MEALS

## (undated) DEVICE — PADDING CAST W4INXL4YD NONSTERILE COT RAYON MICROPLEATED

## (undated) DEVICE — TUBE IRRIG L8IN LNG PT W/ CONN FOR PMP SYS REDEUCE

## (undated) DEVICE — GLOVE,SURG,SENSICARE,ALOE,LF,PF,7: Brand: MEDLINE

## (undated) DEVICE — SUTURE MCRYL SZ 4-0 L18IN ABSRB UD L19MM PS-2 3/8 CIR PRIM Y496G

## (undated) DEVICE — SET ADMIN PRIMING 7ML L30IN 7.35LB 20 GTT 2ND RLER CLMP

## (undated) DEVICE — SET GRAV VENT NVENT CK VLV 3 NDL FREE PRT 10 GTT

## (undated) DEVICE — 4-PORT MANIFOLD: Brand: NEPTUNE 2

## (undated) DEVICE — AMBIENT SUPER MULTIVAC 50 WITH                                    INTEGRATED FINGER SWITCHES IFS: Brand: COBLATION

## (undated) DEVICE — SOLUTION IV 1000ML LAC RINGERS PH 6.5 INJ USP VIAFLX PLAS

## (undated) DEVICE — ZIMMER® STERILE DISPOSABLE TOURNIQUET CUFF WITH PLC, DUAL PORT, SINGLE BLADDER, 30 IN. (76 CM)

## (undated) DEVICE — PACK COOL L W14XL6.5IN 3 LAYR CONSTR SFT CLP CLSR 4 TIE

## (undated) DEVICE — PAD,ABDOMINAL,8"X10",ST,LF: Brand: MEDLINE

## (undated) DEVICE — GAUZE,SPONGE,4"X4",16PLY,STRL,LF,10/TRAY: Brand: MEDLINE

## (undated) DEVICE — GLOVE SURG SZ 65 L12IN FNGR THK94MIL STD WHT LTX FREE

## (undated) DEVICE — TUBING PMP L8FT LNG W/ CONN FOR AR-6400 REDEUCE

## (undated) DEVICE — BANDAGE COMPR W6INXL5YD HI E BGE W/ CLP SURE-WRAP

## (undated) DEVICE — DRAPE ARTHRO 90X124IN KNEE SMS FAB EXT FLD COLL PCH RESIST

## (undated) DEVICE — GLOVE SURG SZ 8 L12IN FNGR THK94MIL STD WHT LTX FREE

## (undated) DEVICE — PACK PROCEDURE SURG ARTHSCP CUST

## (undated) DEVICE — 3M™ TEGADERM™ TRANSPARENT FILM DRESSING FRAME STYLE, 1624W, 2-3/8 IN X 2-3/4 IN (6 CM X 7 CM), 100/CT 4CT/CASE: Brand: 3M™ TEGADERM™

## (undated) DEVICE — DRAPE 54X23IN MAYO STAND COVER REINF

## (undated) DEVICE — 3M™ STERI-STRIP™ REINFORCED ADHESIVE SKIN CLOSURES, R1547, 1/2 IN X 4 IN (12 MM X 100 MM), 6 STRIPS/ENVELOPE: Brand: 3M™ STERI-STRIP™

## (undated) DEVICE — CATHETER IV 20GA L1.25IN PNK FEP SFTY STR HUB RADPQ DISP

## (undated) DEVICE — 3M™ STERI-STRIP™ COMPOUND BENZOIN TINCTURE 40 BAGS/CARTON 4 CARTONS/CASE C1544: Brand: 3M™ STERI-STRIP™

## (undated) DEVICE — GLOVE SURG SZ 85 L12IN FNGR THK94MIL STD WHT LTX FREE

## (undated) DEVICE — 3.5 MM INCISOR STRAIGHT BLADES,                                    POWER/EP-1, MEDIUM GRAY, PACKAGED 6                                    PER BOX, STERILE

## (undated) DEVICE — SOLUTION IRRIG 5L LAC R BG